# Patient Record
Sex: FEMALE | Race: BLACK OR AFRICAN AMERICAN | Employment: FULL TIME | URBAN - METROPOLITAN AREA
[De-identification: names, ages, dates, MRNs, and addresses within clinical notes are randomized per-mention and may not be internally consistent; named-entity substitution may affect disease eponyms.]

---

## 2022-06-29 LAB
CHLAMYDIA, EXTERNAL: NEGATIVE
HBSAG, EXTERNAL: NEGATIVE
HIV, EXTERNAL: NEGATIVE
N. GONORRHEA, EXTERNAL: NEGATIVE
RPR, EXTERNAL: NON REACTIVE
RUBELLA, EXTERNAL: NORMAL
TYPE, ABO & RH, EXTERNAL: NORMAL

## 2023-01-11 LAB — GRBS, EXTERNAL: NEGATIVE

## 2023-02-13 ENCOUNTER — HOSPITAL ENCOUNTER (INPATIENT)
Age: 23
LOS: 5 days | Discharge: HOME | End: 2023-02-18
Attending: OBSTETRICS & GYNECOLOGY | Admitting: OBSTETRICS & GYNECOLOGY
Payer: COMMERCIAL

## 2023-02-13 LAB
ERYTHROCYTE [DISTWIDTH] IN BLOOD BY AUTOMATED COUNT: 16.5 % (ref 11.5–14.5)
HCT VFR BLD AUTO: 34.1 % (ref 35–47)
HGB BLD-MCNC: 10.6 G/DL (ref 11.5–16)
MCH RBC QN AUTO: 24.8 PG (ref 26–34)
MCHC RBC AUTO-ENTMCNC: 31.1 G/DL (ref 30–36.5)
MCV RBC AUTO: 79.9 FL (ref 80–99)
NRBC # BLD: 0 K/UL (ref 0–0.01)
NRBC BLD-RTO: 0 PER 100 WBC
PLATELET # BLD AUTO: 251 K/UL (ref 150–400)
PMV BLD AUTO: 10.7 FL (ref 8.9–12.9)
RBC # BLD AUTO: 4.27 M/UL (ref 3.8–5.2)
WBC # BLD AUTO: 13.6 K/UL (ref 3.6–11)

## 2023-02-13 PROCEDURE — 75410000002 HC LABOR FEE PER 1 HR

## 2023-02-13 PROCEDURE — 3E0DXGC INTRODUCTION OF OTHER THERAPEUTIC SUBSTANCE INTO MOUTH AND PHARYNX, EXTERNAL APPROACH: ICD-10-PCS | Performed by: OBSTETRICS & GYNECOLOGY

## 2023-02-13 PROCEDURE — 86900 BLOOD TYPING SEROLOGIC ABO: CPT

## 2023-02-13 PROCEDURE — 85027 COMPLETE CBC AUTOMATED: CPT

## 2023-02-13 PROCEDURE — 4A1HXCZ MONITORING OF PRODUCTS OF CONCEPTION, CARDIAC RATE, EXTERNAL APPROACH: ICD-10-PCS | Performed by: OBSTETRICS & GYNECOLOGY

## 2023-02-13 PROCEDURE — 74011250636 HC RX REV CODE- 250/636: Performed by: OBSTETRICS & GYNECOLOGY

## 2023-02-13 PROCEDURE — 65270000029 HC RM PRIVATE

## 2023-02-13 PROCEDURE — 36415 COLL VENOUS BLD VENIPUNCTURE: CPT

## 2023-02-13 RX ORDER — BUTORPHANOL TARTRATE 1 MG/ML
1 INJECTION INTRAMUSCULAR; INTRAVENOUS
Status: DISCONTINUED | OUTPATIENT
Start: 2023-02-13 | End: 2023-02-15 | Stop reason: HOSPADM

## 2023-02-13 RX ORDER — ZOLPIDEM TARTRATE 5 MG/1
5 TABLET ORAL
Status: DISCONTINUED | OUTPATIENT
Start: 2023-02-13 | End: 2023-02-15 | Stop reason: HOSPADM

## 2023-02-13 RX ORDER — OXYTOCIN/RINGER'S LACTATE 30/500 ML
0-20 PLASTIC BAG, INJECTION (ML) INTRAVENOUS
Status: DISCONTINUED | OUTPATIENT
Start: 2023-02-14 | End: 2023-02-15 | Stop reason: HOSPADM

## 2023-02-13 RX ORDER — SODIUM CHLORIDE 0.9 % (FLUSH) 0.9 %
5-40 SYRINGE (ML) INJECTION EVERY 8 HOURS
Status: DISCONTINUED | OUTPATIENT
Start: 2023-02-13 | End: 2023-02-15 | Stop reason: HOSPADM

## 2023-02-13 RX ORDER — SODIUM CHLORIDE, SODIUM LACTATE, POTASSIUM CHLORIDE, CALCIUM CHLORIDE 600; 310; 30; 20 MG/100ML; MG/100ML; MG/100ML; MG/100ML
125 INJECTION, SOLUTION INTRAVENOUS CONTINUOUS
Status: DISCONTINUED | OUTPATIENT
Start: 2023-02-13 | End: 2023-02-16

## 2023-02-13 RX ORDER — SODIUM CHLORIDE 0.9 % (FLUSH) 0.9 %
5-40 SYRINGE (ML) INJECTION AS NEEDED
Status: DISCONTINUED | OUTPATIENT
Start: 2023-02-13 | End: 2023-02-15 | Stop reason: HOSPADM

## 2023-02-13 RX ADMIN — BUTORPHANOL TARTRATE 1 MG: 1 INJECTION, SOLUTION INTRAMUSCULAR; INTRAVENOUS at 20:10

## 2023-02-13 RX ADMIN — BUTORPHANOL TARTRATE 1 MG: 1 INJECTION, SOLUTION INTRAMUSCULAR; INTRAVENOUS at 23:54

## 2023-02-13 NOTE — PROGRESS NOTES
1615-Pt arrived to labor and delivery room 5 for scheduled induction for post dates. G1.  LILIAN 2/8/23. Pt denies any complications during pregnancy other than anemia. States appropriate fetal movement. 1752-Just made aware that Dr Levi Henson signed out to Dr Albert Anderson. Dr Albert Anderson called and aware that patient is in room 6.   1810-Dr Pearl at bedside, viewed strip. Discussing POC with patient. 1815-Pt placed in stirrups, cervical ripening balloon placed. 60ml in uterine and 60ml in vaginal balloon. Will monitor post balloon prior to giving cytotec since patient has been ciera. 1930-Bedside and Verbal shift change report given to VALENCIA Bejarano RN  (oncoming nurse) by Cecily Tesfaye. Ignacia Brown RN  (offgoing nurse). Report included the following information SBAR.

## 2023-02-13 NOTE — H&P
History & Physical    Name: Josiah Last MRN: [id number]  SSN: [id number]    YOB: 2000  Age: 25 y.o. Sex: female      Subjective: Induction     Estimated Date of Delivery: None noted. OB History          1    Para        Term                AB        Living             SAB        IAB        Ectopic        Molar        Multiple        Live Births                    Ms. Margarita Tapia is admitted with pregnancy at 40w5d for induction of labor due to late term. Prenatal course was complicated by  anemia (hasn't been taking iron as advised) and elevated WBC which has declined with serial monitoring (17.7- 15.6- 14.7) . Please see prenatal records for details. PMH: lichen planus, eczema  PSH: none  Social History     Occupational History    Not on file   Tobacco Use    Smoking status: Not on file    Smokeless tobacco: Not on file   Substance and Sexual Activity    Alcohol use: Not on file    Drug use: Not on file    Sexual activity: Not on file     No family history on file. Not on File  Prior to Admission medications    Not on File    NKDA    Review of Systems: Pertinent items are noted in the History of Present Illness. Objective:     Vitals: There were no vitals filed for this visit. Physical Exam:  Patient without distress. Abdomen: soft, nontender  Cervical Exam: Closed/Thick/High (at last exam on 23)  Lower Extremities:  - Edema No  EFW 7#  Membranes:  Intact  Fetal Heart Rate: pending upon admission          Prenatal Labs:   O+/RI/GBS neg    Impression/Plan:     Plan: Admit for induction of labor. Group B Strep negative. Plan miso tonight and placement of balloon if possible.     Signed By:  Anita Bradley MD     2023

## 2023-02-13 NOTE — PROGRESS NOTES
OBHospitalist Pocedure Progress Note  Patient seen, fetal heart rate and contraction pattern evaluated.      Physical Exam:  Cervical Exam: Visually 1 cm  Membranes:  Intact  Uterine Activity: Frequency: irregular  Fetal Heart Rate:130  Accelerations: yes  Decelerations: none  Variability- Moderate  Uterine contractions: irregular    Assessment/Plan:  Late term  @ 40w5d  Cook Cath 60/60 placed    Buccal Cytotec marvin Oquendo MD

## 2023-02-14 ENCOUNTER — ANESTHESIA EVENT (OUTPATIENT)
Dept: LABOR AND DELIVERY | Age: 23
End: 2023-02-14
Payer: COMMERCIAL

## 2023-02-14 ENCOUNTER — ANESTHESIA (OUTPATIENT)
Dept: LABOR AND DELIVERY | Age: 23
End: 2023-02-14
Payer: COMMERCIAL

## 2023-02-14 PROCEDURE — 74011000258 HC RX REV CODE- 258: Performed by: OBSTETRICS & GYNECOLOGY

## 2023-02-14 PROCEDURE — 74011250636 HC RX REV CODE- 250/636: Performed by: STUDENT IN AN ORGANIZED HEALTH CARE EDUCATION/TRAINING PROGRAM

## 2023-02-14 PROCEDURE — 74011000250 HC RX REV CODE- 250: Performed by: STUDENT IN AN ORGANIZED HEALTH CARE EDUCATION/TRAINING PROGRAM

## 2023-02-14 PROCEDURE — 74011000250 HC RX REV CODE- 250

## 2023-02-14 PROCEDURE — 74011250637 HC RX REV CODE- 250/637: Performed by: OBSTETRICS & GYNECOLOGY

## 2023-02-14 PROCEDURE — 3E033VJ INTRODUCTION OF OTHER HORMONE INTO PERIPHERAL VEIN, PERCUTANEOUS APPROACH: ICD-10-PCS | Performed by: OBSTETRICS & GYNECOLOGY

## 2023-02-14 PROCEDURE — 65270000029 HC RM PRIVATE

## 2023-02-14 PROCEDURE — 75410000002 HC LABOR FEE PER 1 HR

## 2023-02-14 PROCEDURE — 74011250636 HC RX REV CODE- 250/636: Performed by: OBSTETRICS & GYNECOLOGY

## 2023-02-14 PROCEDURE — 74011250637 HC RX REV CODE- 250/637: Performed by: NURSE PRACTITIONER

## 2023-02-14 RX ORDER — ACETAMINOPHEN 500 MG
1000 TABLET ORAL ONCE
Status: COMPLETED | OUTPATIENT
Start: 2023-02-14 | End: 2023-02-14

## 2023-02-14 RX ORDER — BUPIVACAINE HYDROCHLORIDE 2.5 MG/ML
INJECTION, SOLUTION EPIDURAL; INFILTRATION; INTRACAUDAL AS NEEDED
Status: DISCONTINUED | OUTPATIENT
Start: 2023-02-14 | End: 2023-02-15 | Stop reason: HOSPADM

## 2023-02-14 RX ORDER — FENTANYL/BUPIVACAINE/NS/PF 2-1250MCG
1-16 PREFILLED PUMP RESERVOIR EPIDURAL CONTINUOUS
Status: DISCONTINUED | OUTPATIENT
Start: 2023-02-14 | End: 2023-02-15 | Stop reason: HOSPADM

## 2023-02-14 RX ORDER — BUPIVACAINE HYDROCHLORIDE 5 MG/ML
10 INJECTION, SOLUTION EPIDURAL; INTRACAUDAL AS NEEDED
Status: DISCONTINUED | OUTPATIENT
Start: 2023-02-14 | End: 2023-02-15 | Stop reason: HOSPADM

## 2023-02-14 RX ORDER — LIDOCAINE HYDROCHLORIDE AND EPINEPHRINE 15; 5 MG/ML; UG/ML
1.5 INJECTION, SOLUTION EPIDURAL AS NEEDED
Status: DISCONTINUED | OUTPATIENT
Start: 2023-02-14 | End: 2023-02-15 | Stop reason: HOSPADM

## 2023-02-14 RX ORDER — NORETHINDRONE AND ETHINYL ESTRADIOL 0.5-0.035
KIT ORAL
Status: DISCONTINUED
Start: 2023-02-14 | End: 2023-02-15 | Stop reason: WASHOUT

## 2023-02-14 RX ORDER — DIPHENHYDRAMINE HYDROCHLORIDE 50 MG/ML
25 INJECTION, SOLUTION INTRAMUSCULAR; INTRAVENOUS
Status: DISCONTINUED | OUTPATIENT
Start: 2023-02-14 | End: 2023-02-15 | Stop reason: HOSPADM

## 2023-02-14 RX ORDER — BUPIVACAINE HYDROCHLORIDE 5 MG/ML
INJECTION, SOLUTION EPIDURAL; INTRACAUDAL
Status: COMPLETED | OUTPATIENT
Start: 2023-02-14 | End: 2023-02-14

## 2023-02-14 RX ORDER — CALCIUM CARBONATE 200(500)MG
400 TABLET,CHEWABLE ORAL
Status: DISCONTINUED | OUTPATIENT
Start: 2023-02-15 | End: 2023-02-16

## 2023-02-14 RX ORDER — FENTANYL CITRATE 50 UG/ML
INJECTION, SOLUTION INTRAMUSCULAR; INTRAVENOUS
Status: COMPLETED | OUTPATIENT
Start: 2023-02-14 | End: 2023-02-14

## 2023-02-14 RX ORDER — FENTANYL CITRATE 50 UG/ML
100 INJECTION, SOLUTION INTRAMUSCULAR; INTRAVENOUS ONCE
Status: COMPLETED | OUTPATIENT
Start: 2023-02-14 | End: 2023-02-14

## 2023-02-14 RX ORDER — LANOLIN ALCOHOL/MO/W.PET/CERES
325 CREAM (GRAM) TOPICAL
COMMUNITY

## 2023-02-14 RX ORDER — BUPIVACAINE HYDROCHLORIDE 2.5 MG/ML
INJECTION, SOLUTION EPIDURAL; INFILTRATION; INTRACAUDAL
Status: COMPLETED
Start: 2023-02-14 | End: 2023-02-14

## 2023-02-14 RX ORDER — BUPIVACAINE HYDROCHLORIDE 5 MG/ML
INJECTION, SOLUTION EPIDURAL; INTRACAUDAL
Status: COMPLETED
Start: 2023-02-14 | End: 2023-02-14

## 2023-02-14 RX ORDER — FENTANYL CITRATE 50 UG/ML
INJECTION, SOLUTION INTRAMUSCULAR; INTRAVENOUS
Status: COMPLETED
Start: 2023-02-14 | End: 2023-02-14

## 2023-02-14 RX ORDER — FENTANYL/BUPIVACAINE/NS/PF 2-1250MCG
PREFILLED PUMP RESERVOIR EPIDURAL
Status: COMPLETED
Start: 2023-02-14 | End: 2023-02-14

## 2023-02-14 RX ORDER — NALOXONE HYDROCHLORIDE 0.4 MG/ML
0.4 INJECTION, SOLUTION INTRAMUSCULAR; INTRAVENOUS; SUBCUTANEOUS AS NEEDED
Status: DISCONTINUED | OUTPATIENT
Start: 2023-02-14 | End: 2023-02-15 | Stop reason: HOSPADM

## 2023-02-14 RX ORDER — NORETHINDRONE AND ETHINYL ESTRADIOL 0.5-0.035
10 KIT ORAL ONCE
Status: ACTIVE | OUTPATIENT
Start: 2023-02-14 | End: 2023-02-15

## 2023-02-14 RX ADMIN — BUTORPHANOL TARTRATE 1 MG: 1 INJECTION, SOLUTION INTRAMUSCULAR; INTRAVENOUS at 11:19

## 2023-02-14 RX ADMIN — Medication 10 ML/HR: at 13:37

## 2023-02-14 RX ADMIN — SODIUM CHLORIDE, POTASSIUM CHLORIDE, SODIUM LACTATE AND CALCIUM CHLORIDE 125 ML/HR: 600; 310; 30; 20 INJECTION, SOLUTION INTRAVENOUS at 15:06

## 2023-02-14 RX ADMIN — BUPIVACAINE HYDROCHLORIDE 5 MG: 5 INJECTION, SOLUTION EPIDURAL; INTRACAUDAL; PERINEURAL at 12:55

## 2023-02-14 RX ADMIN — FENTANYL CITRATE 100 MCG: 50 INJECTION, SOLUTION INTRAMUSCULAR; INTRAVENOUS at 12:55

## 2023-02-14 RX ADMIN — Medication 25 MCG: at 01:59

## 2023-02-14 RX ADMIN — OXYTOCIN 2 MILLI-UNITS/MIN: 10 INJECTION INTRAVENOUS at 06:03

## 2023-02-14 RX ADMIN — Medication 10 ML/HR: at 21:29

## 2023-02-14 RX ADMIN — FENTANYL CITRATE 100 MCG: 50 INJECTION, SOLUTION INTRAMUSCULAR; INTRAVENOUS at 22:45

## 2023-02-14 RX ADMIN — SODIUM CHLORIDE, POTASSIUM CHLORIDE, SODIUM LACTATE AND CALCIUM CHLORIDE 125 ML/HR: 600; 310; 30; 20 INJECTION, SOLUTION INTRAVENOUS at 10:16

## 2023-02-14 RX ADMIN — BUPIVACAINE HYDROCHLORIDE 8 ML: 2.5 INJECTION, SOLUTION EPIDURAL; INFILTRATION; INTRACAUDAL; PERINEURAL at 22:45

## 2023-02-14 RX ADMIN — PROMETHAZINE HYDROCHLORIDE 12.5 MG: 25 INJECTION INTRAMUSCULAR; INTRAVENOUS at 00:02

## 2023-02-14 RX ADMIN — ACETAMINOPHEN 1000 MG: 500 TABLET, FILM COATED ORAL at 23:00

## 2023-02-14 NOTE — PROGRESS NOTES
Labor Progress Note  Patient seen, fetal heart rate and contraction pattern evaluated, patient examined. Currently on 2mu pit. No pain, not really feeling ctx currently. No data found. Physical Exam:  Cervical Exam:  3 cm dilated    60% effaced    -2 station    Presenting Part: cephalic  Cervical Position: anterior  Uterine Activity: Frequency: Every 2-3 minutes  Fetal Heart Rate: Baseline: 130s per minute  Variability: moderate  Accelerations: yes  Decelerations: none    Assessment/Plan:  Reassuring fetal status, Continue plan for vaginal delivery, Titrate pit.   Possible AROM with next check,  Reciewed plan of care with pt and her family    Simone Curry MD

## 2023-02-14 NOTE — PROGRESS NOTES
1530: Bedside and Verbal shift change report given to Deb Olson RN (oncoming nurse) by Sharan Santizo RN (offgoing nurse). Report included the following information SBAR, MAR, and Recent Results. 1930: Bedside and Verbal shift change report given to Alfred Gilman RN (oncoming nurse) by Deb Olson RN (offgoing nurse). Report included the following information SBAR, MAR, and Recent Results.

## 2023-02-14 NOTE — PROGRESS NOTES
Labor Progress Note  Patient seen, fetal heart rate and contraction pattern evaluated, patient examined.   Now comfortable with epidural.  On 7mu pit  Patient Vitals for the past 2 hrs:   BP Temp Pulse Resp SpO2   02/14/23 1338 -- -- -- -- 100 %   02/14/23 1337 138/79 -- 82 -- --   02/14/23 1333 -- -- -- -- 100 %   02/14/23 1332 134/78 -- 83 -- --   02/14/23 1328 -- -- -- -- 100 %   02/14/23 1327 126/70 -- 83 18 100 %   02/14/23 1323 -- -- -- -- 100 %   02/14/23 1322 131/63 -- 87 -- --   02/14/23 1318 -- -- -- -- 100 %   02/14/23 1316 133/79 -- 78 18 --   02/14/23 1311 128/69 -- 85 18 100 %   02/14/23 1308 -- -- -- -- 100 %   02/14/23 1307 138/84 -- 76 -- --   02/14/23 1305 137/88 -- 83 -- --   02/14/23 1304 137/81 -- 75 18 100 %   02/14/23 1303 -- -- -- -- 100 %   02/14/23 1200 (!) 148/73 98.5 °F (36.9 °C) 81 18 --       Physical Exam:  Cervical Exam:  3 cm dilated    80% effaced    -2 station    Presenting Part: cephalic  AROM- clear fluid  Uterine Activity: Frequency: Every 2-3 minutes  Fetal Heart Rate: Baseline: 150 per minute  Variability: moderate  Accelerations: yes  Decelerations: none    Assessment/Plan:  Reassuring fetal status, Continue plan for vaginal delivery, Titrate agnes Barkley MD

## 2023-02-14 NOTE — PROGRESS NOTES
Problem: Patient Education: Go to Patient Education Activity  Goal: Patient/Family Education  Outcome: Progressing Towards Goal     Problem: Vaginal Delivery: Day of Deliver-Laboring  Goal: Off Pathway (Use only if patient is Off Pathway)  Outcome: Progressing Towards Goal  Goal: Activity/Safety  Outcome: Progressing Towards Goal  Goal: Consults, if ordered  Outcome: Progressing Towards Goal  Goal: Diagnostic Test/Procedures  Outcome: Progressing Towards Goal  Goal: Nutrition/Diet  Outcome: Progressing Towards Goal  Goal: Discharge Planning  Outcome: Progressing Towards Goal  Goal: Medications  Outcome: Progressing Towards Goal  Goal: Respiratory  Outcome: Progressing Towards Goal  Goal: Treatments/Interventions/Procedures  Outcome: Progressing Towards Goal  Goal: *Vital signs within defined limits  Outcome: Progressing Towards Goal  Goal: *Labs within defined limits  Outcome: Progressing Towards Goal  Goal: *Hemodynamically stable  Outcome: Progressing Towards Goal  Goal: *Optimal pain control at patient's stated goal  Outcome: Progressing Towards Goal     Problem: Vaginal Delivery: Day of Delivery-Post delivery  Goal: Off Pathway (Use only if patient is Off Pathway)  Outcome: Progressing Towards Goal  Goal: Activity/Safety  Outcome: Progressing Towards Goal  Goal: Consults, if ordered  Outcome: Progressing Towards Goal  Goal: Nutrition/Diet  Outcome: Progressing Towards Goal  Goal: Discharge Planning  Outcome: Progressing Towards Goal  Goal: Medications  Outcome: Progressing Towards Goal  Goal: Treatments/Interventions/Procedures  Outcome: Progressing Towards Goal  Goal: *Vital signs within defined limits  Outcome: Progressing Towards Goal  Goal: *Labs within defined limits  Outcome: Progressing Towards Goal  Goal: *Hemodynamically stable  Outcome: Progressing Towards Goal  Goal: *Optimal pain control at patient's stated goal  Outcome: Progressing Towards Goal  Goal: *Participates in infant care  Outcome: Progressing Towards Goal  Goal: *Demonstrates progressive activity  Outcome: Progressing Towards Goal  Goal: *Tolerating diet  Outcome: Progressing Towards Goal

## 2023-02-14 NOTE — PROGRESS NOTES
Labor Progress Note  Patient seen, fetal heart rate and contraction pattern evaluated, patient examined.   Comfortable with epidural  Patient Vitals for the past 2 hrs:   BP Temp Pulse Resp SpO2   02/14/23 1559 137/77 97.5 °F (36.4 °C) 73 18 100 %       Physical Exam:  Cervical Exam:  6 cm dilated    100% effaced    0 station    Presenting Part: cephalic   Uterine Activity: Frequency: Every 2-4 minutes (17mu pit)  Fetal Heart Rate: Baseline: 150 per minute  Variability: moderate  Accelerations: yes  Decelerations: none    Assessment/Plan:  Reassuring fetal status, Continue plan for vaginal delivery    Juan Daniel Gilbert MD

## 2023-02-14 NOTE — ANESTHESIA PROCEDURE NOTES
Epidural Block    Patient location during procedure: OB  Start time: 2/14/2023 12:55 PM  End time: 2/14/2023 1:10 PM  Reason for block: labor epidural  Staffing  Performed: attending   Anesthesiologist: Merle Rebolledo MD  Preanesthetic Checklist  Completed: patient identified, IV checked, site marked, risks and benefits discussed, surgical consent, monitors and equipment checked, pre-op evaluation and timeout performed  Block Placement  Patient position: sitting  Prep: DuraPrep  Sterility prep: cap, drape, gloves and mask  Sedation level: no sedation  Patient monitoring: continuous pulse oximetry and heart rate  Approach: midline  Location: lumbar  Lumbar location: L3-L4  Epidural  Loss of resistance technique: saline  Guidance: landmark technique  Needle  Needle type: Tuohy   Needle gauge: 17 G  Needle length: 9 cm  Needle insertion depth: 6 cm  Catheter type: end hole  Catheter size: 19 G  Catheter at skin depth: 11 cm  Catheter securement method: clear occlusive dressing, surgical tape and liquid medical adhesive  Medications Administered  fentaNYL citrate (PF) injection sedation initial - Epidural   100 mcg - 2/14/2023 12:55:00 PM  bupivacaine (PF) (MARCAINE) 0.5 % (5 mg/mL) Epidural - Epidural   5 mg - 2/14/2023 12:55:00 PM  Assessment  Block outcome: pain improved  Number of attempts: 1  Procedure assessment: patient tolerated procedure well with no immediate complications

## 2023-02-14 NOTE — PROGRESS NOTES
1930 Report received from FRANSISCA Fong notified pt medicated with stadol request phenergan orders received. 2130 Dr Alon Fong notified of continued uncomfortable from cook catheter after medication order to decrease 20cc/bulb which remain 40/40.    2225 continues to have some discomfort with contractions after decreasing fluid out of each bulb. But able to tolerated at this time. 2335 Complain of bleeding slight tug on catheter came out with 40/40 in bulbs. No active bleeding noted. 2/14/23  0740 Bedside shift change report given to GUILHERME Arana (oncoming nurse) by Lc Tom Rn (offgoing nurse). Report included the following information SBAR, Procedure Summary, MAR, Recent Results, and Med Rec Status.

## 2023-02-14 NOTE — PROGRESS NOTES
Dr. Misha Taylor at bedside assessing pt, viewed FHR strip. Cervical exam per Dr. Misha Taylor:  3-4/50/-2. Discussed plan of care with pt and answered questions. 0740: Bedside and Verbal shift change report given to GUILHERME Muñiz RN (oncoming nurse) by VALENCIA Quan RN (offgoing nurse). Report included the following information SBAR, Kardex, Intake/Output, MAR, and Recent Results. 1936:  Pt up to BR, on remote telemetry. 1040:  Pt up to BR, on remote telemetry. 1100:  Pt tried Nitrous Oxide, but states did not help, requesting IV medication. 1113:  Cervical exam per David Hall RN:  posterior, pt very tense with exam, difficult to assess. 1119:  Medication given, see MAR.  1212:  Pt up to BR, Dr. Misha Taylor at bedside, viewed FHR strip. Discussed with pt option to be checked now or get epidural first, pt decided to get epidural first.  IV bolus started, see MAR.  1255:  Pt sitting on side of bed.  1256:  Dr. Derick West at bedside to place epidural.  Monitors off.  1345:  Dr. Misha Taylor at bedside assessing pt, viewed FHR strip. 1347:  Noted damp spot on chux, questionable SROM, AROM of forebag per Dr. Misha Taylor, noted moderate amt of clear fluid. Cervical exam per Dr. Misha Taylor:  3/80/-2.  :  Bedside and Verbal shift change report given to VAELNCIA Rojas RN (oncoming nurse) by GUILHERME Muñiz RN (offgoing nurse). Report included the following information SBAR, Kardex, Intake/Output, MAR, and Recent Results.

## 2023-02-14 NOTE — ANESTHESIA PREPROCEDURE EVALUATION
Relevant Problems   No relevant active problems       Anesthetic History   No history of anesthetic complications            Review of Systems / Medical History  Patient summary reviewed, nursing notes reviewed and pertinent labs reviewed    Pulmonary  Within defined limits                 Neuro/Psych   Within defined limits           Cardiovascular  Within defined limits                Exercise tolerance: >4 METS     GI/Hepatic/Renal  Within defined limits              Endo/Other  Within defined limits           Other Findings              Physical Exam    Airway  Mallampati: II  TM Distance: 4 - 6 cm  Neck ROM: normal range of motion   Mouth opening: Normal     Cardiovascular    Rhythm: regular  Rate: normal         Dental  No notable dental hx       Pulmonary  Breath sounds clear to auscultation               Abdominal  GI exam deferred       Other Findings            Anesthetic Plan    ASA: 2  Anesthesia type: epidural          Induction: Intravenous  Anesthetic plan and risks discussed with: Patient

## 2023-02-15 LAB
ABO + RH BLD: NORMAL
ALBUMIN SERPL-MCNC: 2 G/DL (ref 3.5–5)
ALBUMIN/GLOB SERPL: ABNORMAL (ref 1.1–2.2)
ALP SERPL-CCNC: ABNORMAL U/L (ref 45–117)
ALT SERPL-CCNC: ABNORMAL U/L (ref 12–78)
ANION GAP SERPL CALC-SCNC: 11 MMOL/L (ref 5–15)
AST SERPL-CCNC: ABNORMAL U/L (ref 15–37)
BILIRUB SERPL-MCNC: 1.3 MG/DL (ref 0.2–1)
BLOOD GROUP ANTIBODIES SERPL: NORMAL
BUN SERPL-MCNC: ABNORMAL MG/DL (ref 6–20)
BUN/CREAT SERPL: ABNORMAL (ref 12–20)
CALCIUM SERPL-MCNC: ABNORMAL MG/DL (ref 8.5–10.1)
CHLORIDE SERPL-SCNC: 106 MMOL/L (ref 97–108)
CO2 SERPL-SCNC: 19 MMOL/L (ref 21–32)
CREAT SERPL-MCNC: 1.32 MG/DL (ref 0.55–1.02)
CREAT UR-MCNC: <13 MG/DL
ERYTHROCYTE [DISTWIDTH] IN BLOOD BY AUTOMATED COUNT: 17.2 % (ref 11.5–14.5)
GLOBULIN SER CALC-MCNC: ABNORMAL G/DL (ref 2–4)
GLUCOSE SERPL-MCNC: ABNORMAL MG/DL (ref 65–100)
HCT VFR BLD AUTO: 33.6 % (ref 35–47)
HGB BLD-MCNC: 10.7 G/DL (ref 11.5–16)
MCH RBC QN AUTO: 25.8 PG (ref 26–34)
MCHC RBC AUTO-ENTMCNC: 31.8 G/DL (ref 30–36.5)
MCV RBC AUTO: 81.2 FL (ref 80–99)
NRBC # BLD: 0 K/UL (ref 0–0.01)
NRBC BLD-RTO: 0 PER 100 WBC
PLATELET # BLD AUTO: 345 K/UL (ref 150–400)
PMV BLD AUTO: 11.9 FL (ref 8.9–12.9)
POTASSIUM SERPL-SCNC: 4.2 MMOL/L (ref 3.5–5.1)
PROT SERPL-MCNC: ABNORMAL G/DL (ref 6.4–8.2)
PROT UR-MCNC: 9 MG/DL (ref 0–11.9)
PROT/CREAT UR-RTO: <0.7
RBC # BLD AUTO: 4.14 M/UL (ref 3.8–5.2)
SODIUM SERPL-SCNC: 136 MMOL/L (ref 136–145)
SPECIMEN EXP DATE BLD: NORMAL
URATE SERPL-MCNC: 8.6 MG/DL (ref 2.6–6)
WBC # BLD AUTO: 32.4 K/UL (ref 3.6–11)

## 2023-02-15 PROCEDURE — 77030036554

## 2023-02-15 PROCEDURE — 85027 COMPLETE CBC AUTOMATED: CPT

## 2023-02-15 PROCEDURE — 74011250636 HC RX REV CODE- 250/636: Performed by: ANESTHESIOLOGY

## 2023-02-15 PROCEDURE — 74011250636 HC RX REV CODE- 250/636

## 2023-02-15 PROCEDURE — 75410000002 HC LABOR FEE PER 1 HR

## 2023-02-15 PROCEDURE — 84550 ASSAY OF BLOOD/URIC ACID: CPT

## 2023-02-15 PROCEDURE — 76060000078 HC EPIDURAL ANESTHESIA: Performed by: OBSTETRICS & GYNECOLOGY

## 2023-02-15 PROCEDURE — 76010000391 HC C SECN FIRST 1 HR: Performed by: OBSTETRICS & GYNECOLOGY

## 2023-02-15 PROCEDURE — 88307 TISSUE EXAM BY PATHOLOGIST: CPT

## 2023-02-15 PROCEDURE — 74011000250 HC RX REV CODE- 250: Performed by: OBSTETRICS & GYNECOLOGY

## 2023-02-15 PROCEDURE — 74011250636 HC RX REV CODE- 250/636: Performed by: OBSTETRICS & GYNECOLOGY

## 2023-02-15 PROCEDURE — 84156 ASSAY OF PROTEIN URINE: CPT

## 2023-02-15 PROCEDURE — 65410000002 HC RM PRIVATE OB

## 2023-02-15 PROCEDURE — 74011000250 HC RX REV CODE- 250: Performed by: ANESTHESIOLOGY

## 2023-02-15 PROCEDURE — 75410000003 HC RECOV DEL/VAG/CSECN EA 0.5 HR: Performed by: OBSTETRICS & GYNECOLOGY

## 2023-02-15 PROCEDURE — 80053 COMPREHEN METABOLIC PANEL: CPT

## 2023-02-15 PROCEDURE — 74011000250 HC RX REV CODE- 250: Performed by: STUDENT IN AN ORGANIZED HEALTH CARE EDUCATION/TRAINING PROGRAM

## 2023-02-15 PROCEDURE — 36415 COLL VENOUS BLD VENIPUNCTURE: CPT

## 2023-02-15 PROCEDURE — 76010000392 HC C SECN EA ADDL 0.5 HR: Performed by: OBSTETRICS & GYNECOLOGY

## 2023-02-15 PROCEDURE — 74011250637 HC RX REV CODE- 250/637: Performed by: OBSTETRICS & GYNECOLOGY

## 2023-02-15 RX ORDER — ACETAMINOPHEN 325 MG/1
650 TABLET ORAL
Status: DISCONTINUED | OUTPATIENT
Start: 2023-02-15 | End: 2023-02-18 | Stop reason: HOSPADM

## 2023-02-15 RX ORDER — BUPIVACAINE HYDROCHLORIDE 7.5 MG/ML
INJECTION, SOLUTION EPIDURAL; RETROBULBAR
Status: COMPLETED | OUTPATIENT
Start: 2023-02-15 | End: 2023-02-15

## 2023-02-15 RX ORDER — MORPHINE SULFATE 10 MG/ML
10 INJECTION, SOLUTION INTRAMUSCULAR; INTRAVENOUS
Status: DISCONTINUED | OUTPATIENT
Start: 2023-02-15 | End: 2023-02-18 | Stop reason: HOSPADM

## 2023-02-15 RX ORDER — LABETALOL 200 MG/1
200 TABLET, FILM COATED ORAL EVERY 12 HOURS
Status: DISCONTINUED | OUTPATIENT
Start: 2023-02-15 | End: 2023-02-18

## 2023-02-15 RX ORDER — OXYCODONE HYDROCHLORIDE 5 MG/1
5 TABLET ORAL
Status: DISCONTINUED | OUTPATIENT
Start: 2023-02-15 | End: 2023-02-18 | Stop reason: HOSPADM

## 2023-02-15 RX ORDER — MIDAZOLAM HYDROCHLORIDE 1 MG/ML
INJECTION, SOLUTION INTRAMUSCULAR; INTRAVENOUS AS NEEDED
Status: DISCONTINUED | OUTPATIENT
Start: 2023-02-15 | End: 2023-02-15 | Stop reason: HOSPADM

## 2023-02-15 RX ORDER — OXYTOCIN/RINGER'S LACTATE 30/500 ML
10 PLASTIC BAG, INJECTION (ML) INTRAVENOUS AS NEEDED
Status: DISCONTINUED | OUTPATIENT
Start: 2023-02-15 | End: 2023-02-18 | Stop reason: HOSPADM

## 2023-02-15 RX ORDER — IBUPROFEN 400 MG/1
800 TABLET ORAL EVERY 8 HOURS
Status: DISCONTINUED | OUTPATIENT
Start: 2023-02-15 | End: 2023-02-18 | Stop reason: HOSPADM

## 2023-02-15 RX ORDER — FENTANYL CITRATE 50 UG/ML
INJECTION, SOLUTION INTRAMUSCULAR; INTRAVENOUS
Status: COMPLETED | OUTPATIENT
Start: 2023-02-15 | End: 2023-02-15

## 2023-02-15 RX ORDER — NALOXONE HYDROCHLORIDE 0.4 MG/ML
0.4 INJECTION, SOLUTION INTRAMUSCULAR; INTRAVENOUS; SUBCUTANEOUS AS NEEDED
Status: DISCONTINUED | OUTPATIENT
Start: 2023-02-15 | End: 2023-02-18 | Stop reason: HOSPADM

## 2023-02-15 RX ORDER — KETOROLAC TROMETHAMINE 30 MG/ML
15 INJECTION, SOLUTION INTRAMUSCULAR; INTRAVENOUS EVERY 6 HOURS
Status: COMPLETED | OUTPATIENT
Start: 2023-02-15 | End: 2023-02-16

## 2023-02-15 RX ORDER — NALBUPHINE HYDROCHLORIDE 10 MG/ML
2.5 INJECTION, SOLUTION INTRAMUSCULAR; INTRAVENOUS; SUBCUTANEOUS
Status: DISCONTINUED | OUTPATIENT
Start: 2023-02-15 | End: 2023-02-18 | Stop reason: HOSPADM

## 2023-02-15 RX ORDER — SODIUM CHLORIDE 0.9 % (FLUSH) 0.9 %
5-40 SYRINGE (ML) INJECTION EVERY 8 HOURS
Status: DISCONTINUED | OUTPATIENT
Start: 2023-02-15 | End: 2023-02-18 | Stop reason: HOSPADM

## 2023-02-15 RX ORDER — MORPHINE SULFATE 10 MG/ML
6 INJECTION, SOLUTION INTRAMUSCULAR; INTRAVENOUS
Status: DISCONTINUED | OUTPATIENT
Start: 2023-02-15 | End: 2023-02-18 | Stop reason: HOSPADM

## 2023-02-15 RX ORDER — LIDOCAINE HYDROCHLORIDE AND EPINEPHRINE 20; 5 MG/ML; UG/ML
INJECTION, SOLUTION EPIDURAL; INFILTRATION; INTRACAUDAL; PERINEURAL AS NEEDED
Status: DISCONTINUED | OUTPATIENT
Start: 2023-02-15 | End: 2023-02-15 | Stop reason: HOSPADM

## 2023-02-15 RX ORDER — OXYTOCIN/RINGER'S LACTATE 30/500 ML
87.3 PLASTIC BAG, INJECTION (ML) INTRAVENOUS AS NEEDED
Status: DISCONTINUED | OUTPATIENT
Start: 2023-02-15 | End: 2023-02-18 | Stop reason: HOSPADM

## 2023-02-15 RX ORDER — METHYLERGONOVINE MALEATE 0.2 MG/ML
INJECTION INTRAVENOUS AS NEEDED
Status: DISCONTINUED | OUTPATIENT
Start: 2023-02-15 | End: 2023-02-15 | Stop reason: HOSPADM

## 2023-02-15 RX ORDER — OXYTOCIN/RINGER'S LACTATE 30/500 ML
PLASTIC BAG, INJECTION (ML) INTRAVENOUS
Status: DISCONTINUED | OUTPATIENT
Start: 2023-02-15 | End: 2023-02-15 | Stop reason: HOSPADM

## 2023-02-15 RX ORDER — MORPHINE SULFATE 1 MG/ML
INJECTION, SOLUTION EPIDURAL; INTRATHECAL; INTRAVENOUS
Status: COMPLETED | OUTPATIENT
Start: 2023-02-15 | End: 2023-02-15

## 2023-02-15 RX ORDER — ONDANSETRON 2 MG/ML
4 INJECTION INTRAMUSCULAR; INTRAVENOUS
Status: DISCONTINUED | OUTPATIENT
Start: 2023-02-15 | End: 2023-02-18 | Stop reason: HOSPADM

## 2023-02-15 RX ORDER — MISOPROSTOL 100 UG/1
TABLET ORAL AS NEEDED
Status: DISCONTINUED | OUTPATIENT
Start: 2023-02-15 | End: 2023-02-15 | Stop reason: HOSPADM

## 2023-02-15 RX ORDER — ZOLPIDEM TARTRATE 5 MG/1
5 TABLET ORAL
Status: DISCONTINUED | OUTPATIENT
Start: 2023-02-15 | End: 2023-02-18 | Stop reason: HOSPADM

## 2023-02-15 RX ORDER — SODIUM CHLORIDE 0.9 % (FLUSH) 0.9 %
5-40 SYRINGE (ML) INJECTION AS NEEDED
Status: DISCONTINUED | OUTPATIENT
Start: 2023-02-15 | End: 2023-02-18 | Stop reason: HOSPADM

## 2023-02-15 RX ORDER — METHYLERGONOVINE MALEATE 0.2 MG/ML
INJECTION INTRAVENOUS
Status: DISPENSED
Start: 2023-02-15 | End: 2023-02-15

## 2023-02-15 RX ORDER — ONDANSETRON 2 MG/ML
INJECTION INTRAMUSCULAR; INTRAVENOUS
Status: COMPLETED
Start: 2023-02-15 | End: 2023-02-15

## 2023-02-15 RX ORDER — SIMETHICONE 80 MG
80 TABLET,CHEWABLE ORAL AS NEEDED
Status: DISCONTINUED | OUTPATIENT
Start: 2023-02-15 | End: 2023-02-18 | Stop reason: HOSPADM

## 2023-02-15 RX ORDER — ONDANSETRON 2 MG/ML
4 INJECTION INTRAMUSCULAR; INTRAVENOUS
Status: DISCONTINUED | OUTPATIENT
Start: 2023-02-15 | End: 2023-02-15 | Stop reason: SDUPTHER

## 2023-02-15 RX ORDER — DOCUSATE SODIUM 100 MG/1
100 CAPSULE, LIQUID FILLED ORAL 2 TIMES DAILY
Status: DISCONTINUED | OUTPATIENT
Start: 2023-02-15 | End: 2023-02-18 | Stop reason: HOSPADM

## 2023-02-15 RX ORDER — SODIUM CHLORIDE, SODIUM LACTATE, POTASSIUM CHLORIDE, CALCIUM CHLORIDE 600; 310; 30; 20 MG/100ML; MG/100ML; MG/100ML; MG/100ML
100 INJECTION, SOLUTION INTRAVENOUS CONTINUOUS
Status: DISCONTINUED | OUTPATIENT
Start: 2023-02-15 | End: 2023-02-16

## 2023-02-15 RX ORDER — LIDOCAINE HYDROCHLORIDE AND EPINEPHRINE 20; 5 MG/ML; UG/ML
INJECTION, SOLUTION EPIDURAL; INFILTRATION; INTRACAUDAL; PERINEURAL
Status: COMPLETED
Start: 2023-02-15 | End: 2023-02-15

## 2023-02-15 RX ORDER — NALOXONE HYDROCHLORIDE 0.4 MG/ML
0.4 INJECTION, SOLUTION INTRAMUSCULAR; INTRAVENOUS; SUBCUTANEOUS AS NEEDED
Status: DISCONTINUED | OUTPATIENT
Start: 2023-02-15 | End: 2023-02-15 | Stop reason: SDUPTHER

## 2023-02-15 RX ORDER — DIPHENHYDRAMINE HCL 25 MG
25 CAPSULE ORAL
Status: DISCONTINUED | OUTPATIENT
Start: 2023-02-15 | End: 2023-02-18 | Stop reason: HOSPADM

## 2023-02-15 RX ORDER — OXYCODONE HYDROCHLORIDE 5 MG/1
10 TABLET ORAL
Status: DISCONTINUED | OUTPATIENT
Start: 2023-02-15 | End: 2023-02-18 | Stop reason: HOSPADM

## 2023-02-15 RX ADMIN — Medication 10 ML/HR: at 05:01

## 2023-02-15 RX ADMIN — I.V. FAT EMULSION 250 ML/HR: 20 EMULSION INTRAVENOUS at 09:50

## 2023-02-15 RX ADMIN — SODIUM CHLORIDE, PRESERVATIVE FREE 10 ML: 5 INJECTION INTRAVENOUS at 22:00

## 2023-02-15 RX ADMIN — OXYTOCIN 12 MILLI-UNITS/MIN: 10 INJECTION INTRAVENOUS at 04:00

## 2023-02-15 RX ADMIN — LABETALOL HYDROCHLORIDE 200 MG: 200 TABLET, FILM COATED ORAL at 17:57

## 2023-02-15 RX ADMIN — ONDANSETRON 4 MG: 2 INJECTION INTRAMUSCULAR; INTRAVENOUS at 07:15

## 2023-02-15 RX ADMIN — MORPHINE SULFATE 6 MG: 10 INJECTION, SOLUTION INTRAMUSCULAR; INTRAVENOUS at 11:15

## 2023-02-15 RX ADMIN — LIDOCAINE HYDROCHLORIDE AND EPINEPHRINE 5 ML: 20; 5 INJECTION, SOLUTION EPIDURAL; INFILTRATION; INTRACAUDAL; PERINEURAL at 08:53

## 2023-02-15 RX ADMIN — SODIUM CHLORIDE, POTASSIUM CHLORIDE, SODIUM LACTATE AND CALCIUM CHLORIDE 100 ML/HR: 600; 310; 30; 20 INJECTION, SOLUTION INTRAVENOUS at 19:32

## 2023-02-15 RX ADMIN — SODIUM CHLORIDE 40 MCG/MIN: 9 INJECTION, SOLUTION INTRAVENOUS at 09:37

## 2023-02-15 RX ADMIN — SODIUM CHLORIDE, POTASSIUM CHLORIDE, SODIUM LACTATE AND CALCIUM CHLORIDE 125 ML/HR: 600; 310; 30; 20 INJECTION, SOLUTION INTRAVENOUS at 08:44

## 2023-02-15 RX ADMIN — OXYCODONE HYDROCHLORIDE 5 MG: 5 TABLET ORAL at 22:44

## 2023-02-15 RX ADMIN — BUPIVACAINE HYDROCHLORIDE 10 MG: 7.5 INJECTION, SOLUTION EPIDURAL; RETROBULBAR at 09:18

## 2023-02-15 RX ADMIN — LIDOCAINE HYDROCHLORIDE AND EPINEPHRINE 5 ML: 20; 5 INJECTION, SOLUTION EPIDURAL; INFILTRATION; INTRACAUDAL; PERINEURAL at 09:04

## 2023-02-15 RX ADMIN — ONDANSETRON HYDROCHLORIDE 4 MG: 2 SOLUTION INTRAMUSCULAR; INTRAVENOUS at 07:15

## 2023-02-15 RX ADMIN — FENTANYL CITRATE 15 MCG: 50 INJECTION, SOLUTION INTRAMUSCULAR; INTRAVENOUS at 09:18

## 2023-02-15 RX ADMIN — KETOROLAC TROMETHAMINE 15 MG: 30 INJECTION, SOLUTION INTRAMUSCULAR; INTRAVENOUS at 19:32

## 2023-02-15 RX ADMIN — I.V. FAT EMULSION 121.5 ML: 20 EMULSION INTRAVENOUS at 09:36

## 2023-02-15 RX ADMIN — MIDAZOLAM 2 MG: 1 INJECTION INTRAMUSCULAR; INTRAVENOUS at 09:42

## 2023-02-15 RX ADMIN — Medication 909 ML/HR: at 09:38

## 2023-02-15 RX ADMIN — CEFAZOLIN 2 G: 1 INJECTION, POWDER, FOR SOLUTION INTRAMUSCULAR; INTRAVENOUS at 08:59

## 2023-02-15 RX ADMIN — MORPHINE SULFATE 0.15 MG: 1 INJECTION EPIDURAL; INTRATHECAL; INTRAVENOUS at 09:18

## 2023-02-15 RX ADMIN — LIDOCAINE HYDROCHLORIDE AND EPINEPHRINE 5 ML: 20; 5 INJECTION, SOLUTION EPIDURAL; INFILTRATION; INTRACAUDAL; PERINEURAL at 09:00

## 2023-02-15 NOTE — PROCEDURES
Section Delivery Operative Report     Date of Surgery: 2/15/2023     Preoperative Diagnosis: Failure to progress postdates IOL; fetal tachycardia; 41 weeks    Postoperative Diagnosis: same; viable female infant; maternal lidocaine toxicity    Procedure: Procedure(s):  Low transverse  SECTION via pfannenstiel skin incision    Surgeon(s) and Role:     Beverly Brewer MD - Primary     * Celene Nissen, MD - Assisting; Julieta Feeling OR nurse    Anesthesia: Spinal    Findings: viable female infant in DOP position and deflexed head position. Normal uterus, tubes and ovaries. Small paratubal cyst on R tube. Baby girl with apgars 5, 9 per NICU team. Clear fluid. Uterine atony after delivery which responded to pitocin, uterine misoprostol and IM methergine. Complications: none    Drains/implants: none    EBL 600cc; QBL is pending. See anesthesia report for IVF, UOP. Specimen: placenta    Procedure Detail:    The patient was taken to the operating room, where spinal anesthesia was found to be adequate. The patient was prepped and draped in the normal sterile fashion. Pfannenstiel skin incision was made with the scalpel and carried down to the underlying fascia. The fascial incision was extended laterally with the scalpel. The underlying rectus muscles were stretched superior and inferiorly bluntly. The peritoneum was entered in to bluntly and stretched. The bladder blade was then inserted. A low transverse uterine incision was made with the scalpel and extended laterally with blunt finger dissection. The babys head was then delivered atraumatically. The nose and mouth were suctioned. The cord was clamped and cut after delayed cord clamping and the baby was handed off to the waiting NICU unit staff. Placenta was then delivered spontaneously. The uterus was exteriorized and cleared of all clots and debris. The uterine incision was closed in two layers.  The first layer with running locked layer of 0 moonocryl. During this time the uterus was atonic. 800mcg misoprostol was inserted in to the uterus. Pitocin was already being administered. IM methergine also given with great response. The second layer was an imbricating layer of 0 monocryl with good hemostasis assured. The pelvis was washed with warm normal saline. Good hemostasis was again reassured. The paracolic gutters were washed with warm normal saline and the uterus was returned to the abdomen. Good hemostasis was again reassured throughout. The fascia was closed with 0 Vicryl in a running fashion. Good hemostasis was assured. The skin was closed with a 4-0 monocryl  in a subcuticular fashion. The patient tolerated the procedure well. Sponge, lap, and needle counts were correct times two and the patient was taken to recovery in stable condition.     Cord Blood Results:  Information for the patient's :  Alfredo Aguayo [[id number]]   No results found for: PCTABR, PCTDIG, BILI, ABORH   No results found for: APH, APCO2, APO2, AHCO3, ABEC, ABDC, O2ST, SITE, RSCOM     Prenatal Labs:  Lab Results   Component Value Date/Time    ABO/Rh(D) O POSITIVE 2023 05:03 PM    HBsAg, External negative 2022 12:00 AM    HIV, External negative 2022 12:00 AM    Rubella, External immune 2022 12:00 AM    RPR, External non reactive 2022 12:00 AM    Gonorrhea, External negative 2022 12:00 AM    Chlamydia, External negative 2022 12:00 AM    GrBStrep, External negative 2023 12:00 AM

## 2023-02-15 NOTE — PROGRESS NOTES
Event note  Notified by RN that pt was going to get a spinal due to the epidural not adequately working. Called back when pt was being prepped and I immediately came to the OR. On arrival notified anesthesia was trying to determine if pt received a high spinal vs having a seizure from lidocaine toxicity. Immediately had a sterile cover put on the fetal monitoring and FHT was reassuring 170s. Additional anesthesia support called and I asked Dr Bre Turner to bedside to assist with stat CS. Pt was awake but not responsive and requiring O2 support. Proceeded with an urgent CS after testing the patient without any pain response to proceeding with a pfannenstiel incision as usual protocol. Rest of CS as the op note outlines. The pt awakened comfortably during the CS and was appropriately talking to anesthesia team.  The pt was updated by anesthesiologist and then myself after the procedure.

## 2023-02-15 NOTE — ANESTHESIA PROCEDURE NOTES
Spinal Block    Start time: 2/15/2023 9:18 AM  Performed by: Amber Gonsales MD  Authorized by: Amber Gonsales MD     Pre-procedure:   Indications: primary anesthetic  Preanesthetic Checklist: patient identified, risks and benefits discussed, site marked, patient being monitored and timeout performed    Timeout Time: 09:18 EST      Spinal Block:   Patient Position:  Seated  Prep Region:  Lumbar  Prep: chlorhexidine and patient draped      Location:  L3-4  Technique:  Single shot  Local: fentaNYL citrate (PF) Intrathecal - Intrathecal   15 mcg - 2/15/2023 9:18:00 AM  morphine (PF) 1 mg/mL Intrathecal - Intrathecal   0.15 mg - 2/15/2023 9:18:00 AM  bupivacaine (PF) (MARCAINE) 0.75 % (7.5 mg/mL) Intrathecal - Intrathecal   10 mg - 2/15/2023 9:18:00 AM  Local Dose (mL):  1.5  Med Admin Time: 2/15/2023 9:18 AM    Needle:   Needle Type:  Pencil-tip  Needle Gauge:  25 G  Attempts:  1      Events: CSF confirmed, no blood with aspiration and no paresthesia        Assessment:  Insertion:  Uncomplicated  Patient tolerance:  Patient tolerated the procedure well with no immediate complications  Epidural not getting complete coverage, removed and placed spinal

## 2023-02-15 NOTE — PROGRESS NOTES
6497 - Notified Dr Braswell Flavors that pt is about to receive spinal anesthesia in the OR; she states \"OK, call me when you're ready for me to come. \"

## 2023-02-15 NOTE — PROGRESS NOTES
Labor Progress Note  Patient seen, fetal heart rate and contraction pattern evaluated, patient examined. In to assume care of pt at 830AM. Signout from midwife to [facility] team this morning was via a text that stated pt was still the same with swollen cervix. In to see pt and room with three family members at this time. Pt exhausted and requests a CS for delivery. Has some pain in RLQ. Patient Vitals for the past 1 hrs:   BP Temp Pulse Resp SpO2   02/15/23 0755 120/72 98.2 °F (36.8 °C) (!) 121 18 99 %       Physical Exam:  Cervical Exam:  /-2 anterior  Membranes:  prior SROM  Uterine Activity: ctx every 2-3, pit at 12  Fetal Heart Rate: 170s mod +accels no decels    Assessment/Plan:  21yo  at 41w0d here for IOL due to postdates. Has had minimal cervical change since overnight despite regular ctx on pitocin. Baby feels in a good position and did offer pt an IUPC to confirm adequacy but pt declines and requests a CS. Tm 99 and received tylenol at 5am. Pt with tachycardia and baby with 20 minutes of fetal tachycardia during assessment now which is improving. Warrants close evaluation for development of chorioamnionitis or additional antibiotics postoperatively if pt has a fever. Asked to proceed urgently given failure to progress and fht. Ancef, azithromycin now. Reviewed r/b/a to CS. Allowed family to ask questions.

## 2023-02-15 NOTE — PROGRESS NOTES
8780 Bedside and Verbal shift change report given to FINESSE Joe RN  (oncoming nurse) by Ian Dandy RN  (offgoing nurse). Report included the following information SBAR, Kardex, Procedure Summary, Intake/Output, MAR, and Recent Results. 0800 Dr Marcia Parekh phoned in. Updated with last fetal assessment and cervical check. States she will be at the bedside in 20-30 minutes. 200 Dr. Marcia Parekh at bedside. Reviewed fetal monitor strip. SVE 6-7 - no change. Discussing plan of care. Patient in agreement to proceed with c/section    0840 C/section     0901 Patient transferred via labor bed to OR 1    0937 Delivered liveborn female by Dr. Marcia Parekh via primary c/section. 1013 Patient transferred via hospital bed to LD 6 for recovery. 1132 Lipid infusion completed. 200 Dr. Marcello Humphrey at bedside assessing patient. Patient to remain in LD for 1 hour post completion of lipids. 1133 Severe range /103. Will reassess in 15 minutes.

## 2023-02-15 NOTE — PROGRESS NOTES
Bedside and Verbal shift change report given to Celio Gallagher RN (oncoming nurse) by VALENCIA Acevedo RN (offgoing nurse). Report included the following information SBAR, Kardex, Procedure Summary, Intake/Output, MAR, Recent Results, Alarm Parameters , Pre Procedure Checklist, Procedure Verification, and Quality Measures. 1740: Pt on left side w/ peanut ball, Tburg. 2025Gaspnikos Walsh CNM at bedside. Discussing plan of care. 2030: [place] placed. 350mL yellow/straw, clear urine. Right side w/ peanut ball. 2045: IV fluid bolus d/t tachy FHR. Temp 98.5 oral.     2140: Decreased pit, 10.     2227: K. Tisha Abigail CNM at bedside. SVE 6-7/100/0.     2230: Pt left side, exaggerated runners. Requesting epidural re dose. Kevin Kenney MD.    4052Alex Lowe MD at bedside. Epidural re dose.    2300: Pit restarted, 2. Tylenol 1000mg given. 2350: Left side w/ peanut ball. 0140: Right side exaggerated runners w/ peanut ball, Tburg. 0250: Left side. 3209: Right side. 5320: Left side exaggerated runners w/ peanut ball, Tburg. 0515: SVE 6/100/+1, KELSEA Castillo RN. 0540: Spinning. 5195: Knee chest.    0646: Trell Panda right side. 5230Glenora Naa left side. 9019: SVE 6-7/100/+1. No change w/ swollen cervix. CNM notified. Bedside and Verbal shift change report given to FINESSE Ashton RN (oncoming nurse) by Celio Gallagher RN (offgoing nurse). Report included the following information SBAR, Kardex, Procedure Summary, Intake/Output, MAR, Recent Results, Pre Procedure Checklist, Procedure Verification, and Quality Measures.

## 2023-02-15 NOTE — PROGRESS NOTES
CNM Labor Progress Note     Patient: Olivia Miller MRN: [id number]  SSN: [id number]    YOB: 2000  Age: 25 y.o. Sex: female        Subjective:   Patient comfortable with epidural. Rn have been repositioning and using spinning babies to help with baby position. SVE by RN. Objective:   No data found. Cervical Exam: 6 cm dilated    Swollen % effaced    -1 station    Membranes:   SROM for 18hrs    Current intervetions: reposition, rest  Pain management: epidural  Pitocin @ 12 miliunits/hr      Assessment:   Fetal Heart Rate: Baseline: 150 per minute  Variability: moderate  Accelerations: yes  Decelerations: none    Contractions:  External  Frequency: 3-4min  Duration: 60-90  Strength: moderate    Intrauterine pregnancy at term  Category 1 fetal heart rate tracing         Plan:   Continue current orders/management   [facility] notified of overnight care.          Huber De Guzman

## 2023-02-15 NOTE — PROGRESS NOTES
ARNALDO Labor Progress Note     Patient: Petr Willard MRN: [id number]  SSN: [id number]    YOB: 2000  Age: 25 y.o. Sex: female        Subjective:   Patient seen by ARNALDO. She is comfortable with epidural. Family at bedside supportive. Objective:   Patient Vitals for the past 4 hrs:   Temp Pulse Resp BP SpO2   02/14/23 1938 98.5 °F (36.9 °C) 82 14 129/69 100 %   02/14/23 1906 -- 95 18 129/74 100 %   02/14/23 1818 97.9 °F (36.6 °C) 85 18 127/71 100 %   02/14/23 1658 -- 75 -- 134/75 100 %       Cervical Exam: defer  Membranes:  Spontaneous Rupture of Membranes; Amniotic Fluid: clear fluid    Current intervetions: reposition, garcia cath placed, resting  Pain management: epidural  Pitocin @ 19 miliunits/hr      Assessment:   Fetal Heart Rate: Baseline: 160 per minute  Variability: moderate  Accelerations: yes  Decelerations: none    Contractions:  External  Frequency: 3-4min  Duration: 50-80  Strength: moderate    Intrauterine pregnancy at term  Category 1 fetal heart rate tracing         Plan:     Introduced self to patient,   CNM management. Continue Pitocin titrate.    Recheck in 2-4 hours    Maternal and fetal monitoring per protocol  Roma Stovall CNM

## 2023-02-15 NOTE — PROGRESS NOTES
Postop Check  S: sore, but doing ok. Denies dizziness, lightheadedness. No headache/blurry vision/n/v. Hasn't moved around yet. O: Patient Vitals for the past 4 hrs:   Temp Pulse Resp BP SpO2   02/15/23 1203 -- 94 -- (!) 155/73 --   02/15/23 1200 -- 97 -- -- --   02/15/23 1148 -- 94 -- (!) 151/88 --   02/15/23 1133 -- 85 -- (!) 162/103 --   02/15/23 1118 -- 92 16 (!) 153/81 --   02/15/23 1103 -- 96 16 (!) 140/86 97 %   02/15/23 1048 -- 96 16 (!) 147/89 99 %   02/15/23 1032 -- (!) 110 18 (!) 144/65 --   02/15/23 1028 -- (!) 110 16 (!) 146/66 --   02/15/23 1027 -- (!) 113 18 133/84 99 %   02/15/23 1022 -- (!) 114 -- 133/83 --   02/15/23 1017 98.4 °F (36.9 °C) (!) 113 16 134/82 95 %       Gen NAD AAOx3, awake conversing, bonding with baby  Abd soft appropriately tender, fundus firm and below umbilicus  Dressing dry  LE SCDs, moving well and no edema b/l    A/P: Postop from primary CS for failure to progress active phase, fetal tachycardia complicated by lidocaine toxicity after spinal anesthesia. Has been off lipid treatment per anesthesia management for one hour. Now presenting since delivery with mild-severe range BPs without evidence of severe symptoms with preeclampsia. Recommend continued monitoring on L&D. Check cbc, cmp, uric acid and urine protein-creatinine ratio. Keep garcia at this time.

## 2023-02-15 NOTE — ANESTHESIA POSTPROCEDURE EVALUATION
Post-Anesthesia Evaluation and Assessment    Patient: Miladys Estrada MRN: [id number]  SSN: [id number]    YOB: 2000  Age: 25 y.o. Sex: female      I have evaluated the patient and they are stable and ready for discharge from the PACU. Cardiovascular Function/Vital Signs  Visit Vitals  BP (!) 155/73   Pulse 94   Temp 36.9 °C (98.4 °F)   Resp 16   Ht 5' 2\" (1.575 m)   Wt 81.6 kg (180 lb)   SpO2 97%   Breastfeeding Unknown   BMI 32.92 kg/m²       Patient is status post Epidural anesthesia for Procedure(s):   SECTION. Nausea/Vomiting: None    Postoperative hydration reviewed and adequate. Pain:  Pain Scale 1: Numeric (0 - 10) (02/15/23 1103)  Pain Intensity 1: 8 (02/15/23 1103)   Managed    Neurological Status:   Neuro (WDL): Within Defined Limits (02/15/23 1017)   At baseline    Mental Status, Level of Consciousness: Alert and  oriented to person, place, and time    Pulmonary Status:   O2 Device: None (02/15/23 1027)   Adequate oxygenation and airway patent    Complications related to anesthesia: LAST requring intralipid    Post-anesthesia assessment completed. No continued signs of LAST, safe for floor admission.  No concerns    Signed By: Dianne Perez MD     February 15, 2023

## 2023-02-15 NOTE — PROGRESS NOTES
CNM Labor Progress Note     Patient: Ollie Meza MRN: [id number]  SSN: [id number]    YOB: 2000  Age: 25 y.o. Sex: female        Subjective:   Patient request SVE. Fetal tachycardia noted, no maternal temp. Pt is having some mild cramping on left pelvis. Pitocin stopped due to fetal tachycardia by RN. Will plan to give tums and restart after 2 hours. Discussed plan of care with patient. Objective:   Patient Vitals for the past 4 hrs:   Temp Pulse Resp BP SpO2   02/14/23 2045 98.5 °F (36.9 °C) -- -- -- --   02/14/23 1938 98.5 °F (36.9 °C) 82 14 129/69 100 %   02/14/23 1906 -- 95 18 129/74 100 %       Cervical Exam: 6 cm dilated    100% effaced    -1 station    Membranes:  Spontaneous Rupture of Membranes; Amniotic Fluid: clear fluid, bloody show noted    Current intervetions: reposition, epidural bolus  Pain management: epidural        Assessment:   Fetal Heart Rate: Baseline: 170 per minute  Variability: moderate  Accelerations: yes  Decelerations: none    Contractions:  External/  Frequency: 3-5min  Duration: 60-80  Strength: moderate    Intrauterine pregnancy at term  Category 1 fetal heart rate tracing         Plan:   Continue current orders/management   CNM management   Tylenol po now and tums now  Restart Pitocin at 2300. Re-evaluate in 4 hours or prn.       Magalis Mcmanus

## 2023-02-15 NOTE — PROGRESS NOTES
1214 - Per Dr. Von Wild, after one hour off of the lipids patient is ok to be moved to [facility] [place].

## 2023-02-16 LAB
ALBUMIN SERPL-MCNC: 1.8 G/DL (ref 3.5–5)
ALBUMIN/GLOB SERPL: 0.5 (ref 1.1–2.2)
ALP SERPL-CCNC: 144 U/L (ref 45–117)
ALT SERPL-CCNC: 16 U/L (ref 12–78)
ANION GAP SERPL CALC-SCNC: 10 MMOL/L (ref 5–15)
AST SERPL-CCNC: 29 U/L (ref 15–37)
BASOPHILS # BLD: 0 K/UL (ref 0–0.1)
BASOPHILS NFR BLD: 0 % (ref 0–1)
BILIRUB SERPL-MCNC: 0.3 MG/DL (ref 0.2–1)
BUN SERPL-MCNC: 9 MG/DL (ref 6–20)
BUN/CREAT SERPL: 10 (ref 12–20)
CALCIUM SERPL-MCNC: 7.8 MG/DL (ref 8.5–10.1)
CHLORIDE SERPL-SCNC: 109 MMOL/L (ref 97–108)
CO2 SERPL-SCNC: 21 MMOL/L (ref 21–32)
CREAT SERPL-MCNC: 0.92 MG/DL (ref 0.55–1.02)
DIFFERENTIAL METHOD BLD: ABNORMAL
EOSINOPHIL # BLD: 0 K/UL (ref 0–0.4)
EOSINOPHIL NFR BLD: 0 % (ref 0–7)
ERYTHROCYTE [DISTWIDTH] IN BLOOD BY AUTOMATED COUNT: 17 % (ref 11.5–14.5)
GLOBULIN SER CALC-MCNC: 3.4 G/DL (ref 2–4)
GLUCOSE SERPL-MCNC: 85 MG/DL (ref 65–100)
HCT VFR BLD AUTO: 24.8 % (ref 35–47)
HGB BLD-MCNC: 8.1 G/DL (ref 11.5–16)
IMM GRANULOCYTES # BLD AUTO: 0 K/UL
IMM GRANULOCYTES NFR BLD AUTO: 0 %
LYMPHOCYTES # BLD: 2.1 K/UL (ref 0.8–3.5)
LYMPHOCYTES NFR BLD: 8 % (ref 12–49)
Lab: NORMAL
MCH RBC QN AUTO: 25.7 PG (ref 26–34)
MCHC RBC AUTO-ENTMCNC: 32.7 G/DL (ref 30–36.5)
MCV RBC AUTO: 78.7 FL (ref 80–99)
MONOCYTES # BLD: 1.3 K/UL (ref 0–1)
MONOCYTES NFR BLD: 5 % (ref 5–13)
NEUTS BAND NFR BLD MANUAL: 3 % (ref 0–6)
NEUTS SEG # BLD: 22.7 K/UL (ref 1.8–8)
NEUTS SEG NFR BLD: 84 % (ref 32–75)
NRBC # BLD: 0 K/UL (ref 0–0.01)
NRBC BLD-RTO: 0 PER 100 WBC
PLATELET # BLD AUTO: 202 K/UL (ref 150–400)
PMV BLD AUTO: 10.8 FL (ref 8.9–12.9)
POTASSIUM SERPL-SCNC: 3.6 MMOL/L (ref 3.5–5.1)
PROT SERPL-MCNC: 5.2 G/DL (ref 6.4–8.2)
RBC # BLD AUTO: 3.15 M/UL (ref 3.8–5.2)
RBC MORPH BLD: ABNORMAL
RBC MORPH BLD: ABNORMAL
REFERENCE LAB,REFLB: NORMAL
SODIUM SERPL-SCNC: 140 MMOL/L (ref 136–145)
TEST DESCRIPTION:,ATST: NORMAL
WBC # BLD AUTO: 26.1 K/UL (ref 3.6–11)

## 2023-02-16 PROCEDURE — 80053 COMPREHEN METABOLIC PANEL: CPT

## 2023-02-16 PROCEDURE — 74011000250 HC RX REV CODE- 250: Performed by: OBSTETRICS & GYNECOLOGY

## 2023-02-16 PROCEDURE — 85025 COMPLETE CBC W/AUTO DIFF WBC: CPT

## 2023-02-16 PROCEDURE — 74011250636 HC RX REV CODE- 250/636: Performed by: OBSTETRICS & GYNECOLOGY

## 2023-02-16 PROCEDURE — 74011250637 HC RX REV CODE- 250/637: Performed by: OBSTETRICS & GYNECOLOGY

## 2023-02-16 PROCEDURE — 36415 COLL VENOUS BLD VENIPUNCTURE: CPT

## 2023-02-16 PROCEDURE — 65410000002 HC RM PRIVATE OB

## 2023-02-16 RX ORDER — LANOLIN ALCOHOL/MO/W.PET/CERES
1 CREAM (GRAM) TOPICAL
Status: DISCONTINUED | OUTPATIENT
Start: 2023-02-17 | End: 2023-02-18 | Stop reason: HOSPADM

## 2023-02-16 RX ADMIN — DOCUSATE SODIUM 100 MG: 100 CAPSULE, LIQUID FILLED ORAL at 01:37

## 2023-02-16 RX ADMIN — KETOROLAC TROMETHAMINE 15 MG: 30 INJECTION, SOLUTION INTRAMUSCULAR; INTRAVENOUS at 09:06

## 2023-02-16 RX ADMIN — IBUPROFEN 800 MG: 400 TABLET, FILM COATED ORAL at 15:04

## 2023-02-16 RX ADMIN — SODIUM CHLORIDE, PRESERVATIVE FREE 10 ML: 5 INJECTION INTRAVENOUS at 15:04

## 2023-02-16 RX ADMIN — OXYCODONE HYDROCHLORIDE 10 MG: 5 TABLET ORAL at 22:10

## 2023-02-16 RX ADMIN — ZOLPIDEM TARTRATE 5 MG: 5 TABLET ORAL at 01:37

## 2023-02-16 RX ADMIN — DOCUSATE SODIUM 100 MG: 100 CAPSULE, LIQUID FILLED ORAL at 09:05

## 2023-02-16 RX ADMIN — OXYCODONE HYDROCHLORIDE 5 MG: 5 TABLET ORAL at 05:52

## 2023-02-16 RX ADMIN — DOCUSATE SODIUM 100 MG: 100 CAPSULE, LIQUID FILLED ORAL at 21:11

## 2023-02-16 RX ADMIN — OXYCODONE HYDROCHLORIDE 10 MG: 5 TABLET ORAL at 09:50

## 2023-02-16 RX ADMIN — OXYCODONE HYDROCHLORIDE 10 MG: 5 TABLET ORAL at 15:59

## 2023-02-16 RX ADMIN — KETOROLAC TROMETHAMINE 15 MG: 30 INJECTION, SOLUTION INTRAMUSCULAR; INTRAVENOUS at 01:37

## 2023-02-16 RX ADMIN — LABETALOL HYDROCHLORIDE 200 MG: 200 TABLET, FILM COATED ORAL at 21:11

## 2023-02-16 RX ADMIN — LABETALOL HYDROCHLORIDE 200 MG: 200 TABLET, FILM COATED ORAL at 09:05

## 2023-02-16 NOTE — PROGRESS NOTES
Verbal shift change report given to FRANSISCA Christina RN (oncoming nurse) by Easton Black. Rosmery Rosen RN (offgoing nurse). Report included the following information SBAR, Kardex, Intake/Output, and MAR.       Maryan Dubin, RN

## 2023-02-16 NOTE — PROGRESS NOTES
Post-Operative Day Number 1 Progress Note    Patient doing well post-op day 1 from  delivery without significant complaints. Pain controlled on current medication. Voiding without difficulty, normal lochia. Sore with moving around but no acute pain. No flatus yet. Vitals:  Patient Vitals for the past 8 hrs:   BP Temp Pulse Resp SpO2   23 0858 (!) 152/84 98.2 °F (36.8 °C) 95 16 98 %   23 0550 124/84 97.7 °F (36.5 °C) 92 16 97 %     Temp (24hrs), Av.8 °F (37.1 °C), Min:97.7 °F (36.5 °C), Max:99.7 °F (37.6 °C)          Exam:  Patient without distress. Abdomen soft, fundus firm at level of umbilicus, non tender. Incision dry and clean without erythema. Lower extremities are negative for swelling, cords or tenderness. Lab/Data Review:  Recent Results (from the past 12 hour(s))   CBC WITH AUTOMATED DIFF    Collection Time: 23  6:05 AM   Result Value Ref Range    WBC 26.1 (H) 3.6 - 11.0 K/uL    RBC 3.15 (L) 3.80 - 5.20 M/uL    HGB 8.1 (L) 11.5 - 16.0 g/dL    HCT 24.8 (L) 35.0 - 47.0 %    MCV 78.7 (L) 80.0 - 99.0 FL    MCH 25.7 (L) 26.0 - 34.0 PG    MCHC 32.7 30.0 - 36.5 g/dL    RDW 17.0 (H) 11.5 - 14.5 %    PLATELET 207 767 - 012 K/uL    MPV 10.8 8.9 - 12.9 FL    NRBC 0.0 0  WBC    ABSOLUTE NRBC 0.00 0.00 - 0.01 K/uL    NEUTROPHILS 84 (H) 32 - 75 %    BAND NEUTROPHILS 3 0 - 6 %    LYMPHOCYTES 8 (L) 12 - 49 %    MONOCYTES 5 5 - 13 %    EOSINOPHILS 0 0 - 7 %    BASOPHILS 0 0 - 1 %    IMMATURE GRANULOCYTES 0 %    ABS. NEUTROPHILS 22.7 (H) 1.8 - 8.0 K/UL    ABS. LYMPHOCYTES 2.1 0.8 - 3.5 K/UL    ABS. MONOCYTES 1.3 (H) 0.0 - 1.0 K/UL    ABS. EOSINOPHILS 0.0 0.0 - 0.4 K/UL    ABS. BASOPHILS 0.0 0.0 - 0.1 K/UL    ABS. IMM.  GRANS. 0.0 K/UL    DF MANUAL      RBC COMMENTS ANISOCYTOSIS  1+        RBC COMMENTS MICROCYTOSIS  1+       METABOLIC PANEL, COMPREHENSIVE    Collection Time: 23  6:05 AM   Result Value Ref Range    Sodium 140 136 - 145 mmol/L    Potassium 3.6 3.5 - 5.1 mmol/L    Chloride 109 (H) 97 - 108 mmol/L    CO2 21 21 - 32 mmol/L    Anion gap 10 5 - 15 mmol/L    Glucose 85 65 - 100 mg/dL    BUN 9 6 - 20 MG/DL    Creatinine 0.92 0.55 - 1.02 MG/DL    BUN/Creatinine ratio 10 (L) 12 - 20      eGFR >60 >60 ml/min/1.73m2    Calcium 7.8 (L) 8.5 - 10.1 MG/DL    Bilirubin, total 0.3 0.2 - 1.0 MG/DL    ALT (SGPT) 16 12 - 78 U/L    AST (SGOT) 29 15 - 37 U/L    Alk. phosphatase 144 (H) 45 - 117 U/L    Protein, total 5.2 (L) 6.4 - 8.2 g/dL    Albumin 1.8 (L) 3.5 - 5.0 g/dL    Globulin 3.4 2.0 - 4.0 g/dL    A-G Ratio 0.5 (L) 1.1 - 2.2           Assessment and Plan:  POD#1 s/p primary CS for failed active phase and fetal tachycardia. Complicated by lidocaine toxicity treated with lipids. PP course then complicated by mild-severe range BPs suspected due to the lipid treatment and anticipated to resolve spontaneously. UPCR was negative. Pt without complaint of preeclampsia. BP well managed on labetalol 200 BID. Lab abnormalities yesterday in setting of recent lipid administration - now all  improved after IVF overnight. Iron deficiency anemia now with acute blood loss anemia hgb 8 - for iron treatment. Pt asymptomatic. Reviewed in detail with pt and her dad at bedside all of the events of yesterday, treatment and anticipated recovery. Will need follow up appt with Dr Irene Tavarez in 1-2 weeks for incision check and BP check.     Baby girl/benign labs

## 2023-02-16 NOTE — PROGRESS NOTES
1700-TRANSFER - IN REPORT:    Verbal report received from [facility] on [place] [facility]  being received from (unit) for routine progression of care      Report consisted of patients Situation, Background, Assessment and   Recommendations(SBAR). Information from the following report(s) SBAR, Kardex, Intake/Output, MAR, and Recent Results was reviewed with the receiving nurse. Opportunity for questions and clarification was provided. Assessment completed upon patients arrival to unit and care assumed. 1910-Per Dr. Casey Olmstead, remove garcia, run IVFs over night and will be addressed in AM, order toradol 15 mg Q6h for 24 hours, order a CMP and CBC, discontinue BMP.

## 2023-02-16 NOTE — ADT AUTH CERT NOTES
Comments  Comment          Patient Demographics    Patient Name   Azalia Martinez   [id number] Legal Sex   Female    2000 Address   [address] [address] Phone   [phone] (Home)     CSN:   [id number]     [address] [place] [address] Date: Admit Time Room Bed   2023  4:12 PM [place] [[id number]] 01 [[id number]]     Attending Providers    Provider Pager From To   Lauren Jain MD  23      Patient Contacts    Name Relation Home Work Mobile   Vineet Cho Father [phone]       Utilization Reviews       IOL by Hair Schultz RN       Review Entered Review Status   2023 1417 In Primary      Criteria Review   Ms. Lavonne Emerson is admitted with pregnancy at 40w5d for induction of labor due to late term. Prenatal course was complicated by  anemia (hasn't been taking iron as advised) and elevated WBC which has declined with serial monitoring (17.7- 15.6- 14.7)      Physical Exam:  Patient without distress. Abdomen: soft, nontender  Cervical Exam: Closed/Thick/High (at last exam on 23)  Lower Extremities:  - Edema No  EFW 7#  Membranes:  Intact  Fetal Heart Rate: pending upon admission           Prenatal Labs:   O+/RI/GBS neg     Impression/Plan:     Plan: Admit for induction of labor. Group B Strep negative. Plan miso tonight and placement of balloon if possible. Additional Notes           H&P Notes     H&P by Lauren Jani MD at 23 167 documented on Admission (Current) from 2023 in [address] INFANT    Author: Lauren Jain MD Author Type: Physician Filed: 23 3531   Note Status: Signed Cosign: Cosign Not Required Date of Service: 23   : Lauren Jain MD (Physician)               History & Physical     Name: Veena Trimble MRN: [id number]  SSN: [id number]    YOB: 2000  Age: 25 y.o. Sex: female       Subjective: Induction      Estimated Date of Delivery: None noted.   OB History            1    Para        Term         AB        Living               SAB        IAB        Ectopic        Molar        Multiple        Live Births                       Ms. Margarita Tapia is admitted with pregnancy at 40w5d for induction of labor due to late term. Prenatal course was complicated by  anemia (hasn't been taking iron as advised) and elevated WBC which has declined with serial monitoring (17.7- 15.6- 14.7) . Please see prenatal records for details. PMH: lichen planus, eczema  PSH: none  Social History           Occupational History    Not on file   Tobacco Use    Smoking status: Not on file    Smokeless tobacco: Not on file   Substance and Sexual Activity    Alcohol use: Not on file    Drug use: Not on file    Sexual activity: Not on file      No family history on file. Not on File  Prior to Admission medications    Not on File    NKDA     Review of Systems: Pertinent items are noted in the History of Present Illness. Objective:      Vitals: There were no vitals filed for this visit. Physical Exam:  Patient without distress. Abdomen: soft, nontender  Cervical Exam: Closed/Thick/High (at last exam on 23)  Lower Extremities:  - Edema No  EFW 7#  Membranes:  Intact  Fetal Heart Rate: pending upon admission           Prenatal Labs:   O+/RI/GBS neg     Impression/Plan:      Plan: Admit for induction of labor. Group B Strep negative. Plan miso tonight and placement of balloon if possible.      Signed By:  Anita Bradley MD      2023           Comments  Comment          To print report, click blue 'Print' hyperlink at right    Report Name Print   Delivery:Mom [address] [facility]          [address] [place] [address]  [phone]       Patient: Josiah Last  MRN: [id number]  :2000      Ottoniel Luther     MRN: [id number]     Link to Baby  Comment        [de-identified] name MRN Account  Age Sex Admission Type    Leandro Tovar [id number] 2/15/23 0 days F Confirmed Admission - L&D [place]     OB History       1    Para   1    Term   1            AB        Living   1      SAB        IAB        Ectopic        Molar        Multiple   0    Live Births   1         # Outcome Date GA Labor/2nd Weight Sex Delivery Anes PTL Lv A1 A5   1 Term 02/15/23 41w0d  3.805 kg F CS-LTranv Epidural N Living 5 9   Name: Liz Kulkarni   Complications: Failure to Progress in First Stage   Location: Other   Delivering Clinician: Yifan Leon MD        Prenatal History    Flowsheet Row Most Recent Value   Did You Receive Prenatal Care Yes   Name Of [facility] Provider Dr Diana Dodson [facility] (Maternal Fetal Medicine)? No   Fetal Ultrasound Abnormalities/Concerns? No   Infant Feeding Formula   Circumcision Planned No   Pediatrician After Birth/ Follow Up Baby Visits Salvador Saliva ( [employer])     Dating Summary    Working LILIAN: 23 set by Leonardo Vaughn RN on 23 based on Other Basis     Based On LILIAN GA Diff GA User Date   Other Basis 23 Working  Leonardo Vaughn RN 23     Prenatal Results    Prenatal Labs    Test Value Date Time   ABO/Rh * O positive  22    Antibody Scrn      Hgb      Hct      Platelets      Rubella * immune  22    RPR * non reactive  22    T.  Pallidum Antibody      Urine      Hep B Surf Ag * negative  22    Hep C      HIV * negative  22    Gonorrhea * negative  22    Chlamydia * negative  22    TSH      GTT, 1 HR (Glucola)      GTT, Fasting      GTT, 1 HR      GTT, 2 HR      GTT, 3 HR        3rd Trimester    Test Value Date Time   Hgb      Hct      Platelets      Group B Strep * negative  23    Antibody Scrn      TSH      T. Pallidum Antibody      Hep B Surf Ag      Gonorrhea      Chlamydia       Screening/Diagnostics    Test Value Date Time   AFP Only      AFP Tetra      Hgb Electrophoresis      Amniocentesis      Cystic Fibrosis      Thalessemia      Brian-Sachs      [place] PAP Smear      Beta HCG      NT      NIPT      COVID-19        Lab    Test Value Date Time   GTT, Fasting      GTT, 1HR      GTT, 2HR      GTT, 3HR      RPR * non reactive  22    Beta HCG      CMV Ab      Toxoplasma Ab      Varicella Zoster Ab         Legend    *: Historical  View all results for this pregnancy     Caio Thurmna [[id number]]   Delivery    Birth date/time: 2/15/2023 09:37:00  Delivery type: , Low Transverse   categorization: Primary   priority: Emergency  Indications for : Fetal Intolerance of Labor, Failure to Progress  Complications: Failure to Progress in First Stage  Labor Events     labor?: No   steroids?: None  Rupture date/time: 2023 1347  Rupture type: SROM  Fluid color: Clear  Fluid odor: None  Trial of labor?: Yes  Cervical ripening: [place]/EASI  Induction: Oxytocin  First cervical ripening date/time: 2023 1815  Induction indications: Post-term Gestation  Complications: Failure to Progress in First Stage  Delivery Providers    Delivering clinician: Zafar Turcios MD  Provider Role   Zafar Turcios MD Obstetrician   Julio Gary, ALISA Primary Nurse   Yary Núñez RN Primary [place] Nurse    NICU Nurse    Neonatologist   Jatinder Guerrero MD Anesthesiologist   Nettie Jorge, CRNA CRNA    Nurse Practitioner    Midwife    Nursery Nurse     Anesthesia    Method: Epidural  Multiple Birth Onset Second Stage    No data filed  Operative Delivery    Forceps attempted?: No  Vacuum extractor attempted?: No  Presentation    Presentation: Vertex  Apgars    Living status: Living  Apgars:  1 min. :  5 min.:  10 min. :  15 min.:  20 min.:    Skin color:  0  1       Heart rate:  2  2       Reflex irritability:  1  2       Muscle tone:  1  2       Respiratory effort:  1  2       Total:  5  9       Apgars assigned by: Zachery Dakin SMITH,NNP-BC  Resuscitation    Method: C-PAP, Suctioning-tracheal, Tactile Stimulation  Shoulder Dystocia    Shoulder dystocia present?: No  Measurements    Weight: 3805 g  Weight (lbs): 8 lb 6.2 oz  Length: 54.6 cm  Length (in): 21.5\"  Head circumference: 34 cm  Abdominal girth: 35 cm  Lacerations    Episiotomy: None    Lacerations: None  Repair Needed: None  # of Repair Packets:   Suture Type and Size:   Suture Comment:   Estimated Blood Loss (mL):       Placenta    Placenta Date/Time: 2/15/2023 0939  Removal: Manual Removal  Appearance: Normal Placenta disposition: Discarded     Vaginal Counts    Skin to Skin    Skin to skin initiated date/time: 2/15/2023 1020  Skin to skin with:  Mother  Delivery Summary History    Event User Date/Time   Signed Santo Ontiveros RN 2/15/2023 10:50:06   Opened for Addendum Giovanna Macias 2/15/2023 10:53:10   Signed Santo Ontiveros RN 2/15/2023 11:51:59

## 2023-02-16 NOTE — ROUTINE PROCESS
1930: Bedside and Verbal shift change report given to Pastora España RN (oncoming nurse) by Adrien Dorman RN (offgoing nurse). Report included the following information SBAR, Kardex, ED Summary, OR Summary, Procedure Summary, Intake/Output, MAR, and Recent Results. 1940: RN to bedside to meet pt, attempt to assist pt to bathroom and remove garcia- pt declined at this time. RN educated pt on importance of movement and garcia removal d/t infection risk. Pt wishes to remove garcia at ' latest possible time or in the morning' pt agreeable to removing garcia at 2130- 12 hours post c/s.      2140: Pt complaining of increased burning around IV site. IV currently running LR 100mL/hr. No s/x of infiltration. Skin warm to touch, no redness. 2145: Pt continue complaining of increased burning. Pt educated on use of IV and options discussed. Pt would like new IV placed d/t burning discomfort. 2150: New IV placed, 20G in R hand- LR running at 100mL/hr.      2235: Per Dr. Joslyn Ricketts, okay to give PO david in addition to IV toradol d/t pts report of increased pain.

## 2023-02-16 NOTE — ROUTINE PROCESS
Bedside shift change report given to Dayne Johnson RN (oncoming nurse) by Irene Martinez RN (offgoing nurse). Report included the following information SBAR.

## 2023-02-17 PROCEDURE — 65410000002 HC RM PRIVATE OB

## 2023-02-17 PROCEDURE — 74011250637 HC RX REV CODE- 250/637: Performed by: OBSTETRICS & GYNECOLOGY

## 2023-02-17 RX ADMIN — OXYCODONE HYDROCHLORIDE 10 MG: 5 TABLET ORAL at 05:03

## 2023-02-17 RX ADMIN — LABETALOL HYDROCHLORIDE 200 MG: 200 TABLET, FILM COATED ORAL at 20:47

## 2023-02-17 RX ADMIN — IBUPROFEN 800 MG: 400 TABLET, FILM COATED ORAL at 17:40

## 2023-02-17 RX ADMIN — DOCUSATE SODIUM 100 MG: 100 CAPSULE, LIQUID FILLED ORAL at 09:10

## 2023-02-17 RX ADMIN — IBUPROFEN 800 MG: 400 TABLET, FILM COATED ORAL at 09:10

## 2023-02-17 RX ADMIN — LABETALOL HYDROCHLORIDE 200 MG: 200 TABLET, FILM COATED ORAL at 09:10

## 2023-02-17 RX ADMIN — FERROUS SULFATE TAB 325 MG (65 MG ELEMENTAL FE) 325 MG: 325 (65 FE) TAB at 09:10

## 2023-02-17 NOTE — PROGRESS NOTES
Post-Operative Day Number 2 Progress Note    Patient doing well post-op day 1 from  delivery without significant complaints. Pain controlled on current medication. Voiding without difficulty, normal lochia.  +flatus  Denies HA, changes in vision    Vitals:  Patient Vitals for the past 8 hrs:   BP Temp Pulse Resp SpO2   23 0503 134/82 97.9 °F (36.6 °C) 99 16 98 %       Temp (24hrs), Av.2 °F (36.8 °C), Min:97.9 °F (36.6 °C), Max:98.6 °F (37 °C)          Exam:  Patient without distress. Abdomen soft, fundus firm at level of umbilicus, non tender. Incision dry and clean without erythema covered with dressing               Lower extremities are negative for swelling, cords or tenderness. Lab/Data Review:  No results found for this or any previous visit (from the past 12 hour(s)). Assessment and Plan:  POD#2 s/p primary CS for failed active phase and fetal tachycardia. Complicated by lidocaine toxicity treated with lipids. PP course then complicated by mild-severe range BPs suspected due to the lipid treatment and anticipated to resolve spontaneously. UPCR was negative. Pt without complaints of preeclampsia. BP well managed on labetalol 200 BID. Lab abnormalities yesterday in setting of recent lipid administration - now all  improved after IVF overnight. Iron deficiency anemia now with acute blood loss anemia hgb 8 - for iron treatment. Pt asymptomatic. Will need follow up appt with Dr Deette Barthel in 1-2 weeks for incision check and BP check.     Baby girl/benign labs  Discharge tomorrow    Bladimir Teddy VELAZQUEZ   2023  7:28 AM

## 2023-02-17 NOTE — ROUTINE PROCESS
Bedside shift change report given to A Filomena Saenz RN (oncoming nurse) by Eveline Beal RN (offgoing nurse). Report included the following information SBAR.

## 2023-02-18 VITALS
RESPIRATION RATE: 16 BRPM | OXYGEN SATURATION: 97 % | TEMPERATURE: 98.6 F | WEIGHT: 180 LBS | SYSTOLIC BLOOD PRESSURE: 151 MMHG | HEART RATE: 82 BPM | BODY MASS INDEX: 33.13 KG/M2 | HEIGHT: 62 IN | DIASTOLIC BLOOD PRESSURE: 93 MMHG

## 2023-02-18 PROCEDURE — 74011250637 HC RX REV CODE- 250/637: Performed by: OBSTETRICS & GYNECOLOGY

## 2023-02-18 RX ORDER — LABETALOL 200 MG/1
200 TABLET, FILM COATED ORAL ONCE
Status: COMPLETED | OUTPATIENT
Start: 2023-02-18 | End: 2023-02-18

## 2023-02-18 RX ORDER — OXYCODONE HYDROCHLORIDE 5 MG/1
5 TABLET ORAL
Qty: 10 TABLET | Refills: 0 | Status: SHIPPED | OUTPATIENT
Start: 2023-02-18 | End: 2023-02-23

## 2023-02-18 RX ORDER — DOCUSATE SODIUM 100 MG/1
100 CAPSULE, LIQUID FILLED ORAL 2 TIMES DAILY
Qty: 60 CAPSULE | Refills: 2 | Status: SHIPPED | OUTPATIENT
Start: 2023-02-18 | End: 2023-05-19

## 2023-02-18 RX ORDER — LABETALOL 200 MG/1
400 TABLET, FILM COATED ORAL EVERY 12 HOURS
Status: DISCONTINUED | OUTPATIENT
Start: 2023-02-18 | End: 2023-02-18 | Stop reason: HOSPADM

## 2023-02-18 RX ORDER — ACETAMINOPHEN 325 MG/1
1000 TABLET ORAL
Qty: 60 TABLET | Refills: 1 | Status: SHIPPED | OUTPATIENT
Start: 2023-02-18

## 2023-02-18 RX ORDER — LABETALOL 200 MG/1
400 TABLET, FILM COATED ORAL EVERY 12 HOURS
Qty: 60 TABLET | Refills: 1 | Status: SHIPPED | OUTPATIENT
Start: 2023-02-18

## 2023-02-18 RX ORDER — IBUPROFEN 800 MG/1
800 TABLET ORAL
Qty: 60 TABLET | Refills: 1 | Status: SHIPPED | OUTPATIENT
Start: 2023-02-18

## 2023-02-18 RX ADMIN — LABETALOL HYDROCHLORIDE 200 MG: 200 TABLET, FILM COATED ORAL at 11:17

## 2023-02-18 RX ADMIN — FERROUS SULFATE TAB 325 MG (65 MG ELEMENTAL FE) 325 MG: 325 (65 FE) TAB at 09:18

## 2023-02-18 RX ADMIN — DOCUSATE SODIUM 100 MG: 100 CAPSULE, LIQUID FILLED ORAL at 09:18

## 2023-02-18 RX ADMIN — LABETALOL HYDROCHLORIDE 200 MG: 200 TABLET, FILM COATED ORAL at 09:18

## 2023-02-18 RX ADMIN — IBUPROFEN 800 MG: 400 TABLET, FILM COATED ORAL at 11:17

## 2023-02-18 RX ADMIN — IBUPROFEN 800 MG: 400 TABLET, FILM COATED ORAL at 01:27

## 2023-02-18 NOTE — DISCHARGE SUMMARY
Patient ID:  Timothy Magana  [id number]  55 y.o.  2000    Admit Date: 2023    Discharge Date: 2023     Admitting Physician: Lorna Prather MD  Attending Physician: Christi Izquierdo MD    Admission Diagnoses: Labor and delivery indication for care or intervention [O75.9]    Discharge Diagnoses: Same as above with PCS producing a viable infant. Information for the patient's :  Fatou Cheema [[id number]]           Additional Diagnoses:  late term . Maternal Labs:   Lab Results   Component Value Date/Time    HBsAg, External negative 2022 12:00 AM    HIV, External negative 2022 12:00 AM    Rubella, External immune 2022 12:00 AM    RPR, External non reactive 2022 12:00 AM    GrBStrep, External negative 2023 12:00 AM       Cord Blood Results:   Information for the patient's :  Fatou Cheema [[id number]]     Lab Results   Component Value Date/Time    ABO/Rh(D) O POSITIVE 02/15/2023 09:45 AM    DONA IgG NEG 02/15/2023 09:45 AM    Bilirubin if DONA pos: IF DIRECT MESFIN POSITIVE, BILIRUBIN TO FOLLOW 02/15/2023 09:45 AM            History of Present Illness:   OB History          1    Para   1    Term   1            AB        Living   1         SAB        IAB        Ectopic        Molar        Multiple   0    Live Births   1              Admitted for induction of labor. Hospital Course:   Patient was admitted as above and delivered via PCS . Please the chart for details. The postpartum course was complicated by events below. She was deemed stable for discharge home on day 3.    26 yo P1 POD 3 s/p PCS for active phase arrest after late term IOL. 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 PP course then complicated by mild to severe range BPs suspected due to the lipid treatment. Elevated Bps: gestational hypertension v. Reaction to lidocaine and/or lipid infusion.  Bps persistently mild range on Labetalol 200 mg po bid, started shortly after delivery. Neg preeclampsia labs. PCR was negative. Lab abnormalities initially in setting of recent lipid administration - repeat improved. - Increase Labetalol to 400 mg po bid. Postpartum anemia: due to acute blood loss. Pt asx with stable vitals. On po iron. Rh pos, Rub imm. Female infant. Routine care. DC home today. Instructions and precautions reviewed. Advised daily home BP monitoring and followup in office early next week for BP check. Parameters reviewed. Follow-up:  She was instructed to follow-up with her obstetrician in 3 days.     Results of Postpartum Depression Screening:  EPDS             Signed:  Delbert Ibanez MD  2/18/2023  10:17 AM

## 2023-02-18 NOTE — PROGRESS NOTES
Post-Operative  Day        Patient doing well without significant complaints. Tolerating diet, passing flatus, voiding and ambulating without difficulty. Denies ha/vision changes/ruq pain/cp/sob. Says she's ready to go home. Vitals:  Visit Vitals  BP (!) 145/93 (BP 1 Location: Left upper arm, BP Patient Position: At rest;Lying)   Pulse 86   Temp 97.9 °F (36.6 °C)   Resp 16   Ht 5' 2\" (1.575 m)   Wt 81.6 kg (180 lb)   SpO2 97%   Breastfeeding Unknown   BMI 32.92 kg/m²     Temp (24hrs), Av.9 °F (36.6 °C), Min:97.7 °F (36.5 °C), Max:98.1 °F (36.7 °C)        Exam:      Patient without distress. Abdomen: soft, expected tenderness, fundus firm                Wound incision clean, dry and intact               Lower extremities are nontender without edema. Labs:   Lab Results   Component Value Date/Time    WBC 26.1 (H) 2023 06:05 AM    WBC 32.4 (H) 02/15/2023 12:38 PM    WBC 13.6 (H) 2023 05:03 PM    HGB 8.1 (L) 2023 06:05 AM    HGB 10.7 (L) 02/15/2023 12:38 PM    HGB 10.6 (L) 2023 05:03 PM    HCT 24.8 (L) 2023 06:05 AM    HCT 33.6 (L) 02/15/2023 12:38 PM    HCT 34.1 (L) 2023 05:03 PM    PLATELET 273 445 06:05 AM    PLATELET 342  12:38 PM    PLATELET 576  05:03 PM       No results found for this or any previous visit (from the past 24 hour(s)). Assessment:   24 yo P1 POD 3 s/p PCS for active phase arrest after late term IOL. 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 PP course then complicated by mild to severe range BPs suspected due to the lipid treatment. Elevated Bps: gestational hypertension v. Reaction to lidocaine and/or lipid infusion. Bps persistently mild range on Labetalol 200 mg po bid, started shortly after delivery. Neg preeclampsia labs. PCR was negative. Lab abnormalities initially in setting of recent lipid administration - repeat improved. - Increase Labetalol to 400 mg po bid. Postpartum anemia: due to acute blood loss. Pt asx with stable vitals. On po iron. Rh pos, Rub imm. Female infant. Routine care. DC home today. Instructions and precautions reviewed. Advised daily home BP monitoring and followup in office early next week for BP check. Parameters reviewed.     Signed By: Santo Cockayne, MD     February 18, 2023

## 2023-02-18 NOTE — PROGRESS NOTES
Bedside and Verbal shift change report given to MIKEY Parker RN (oncoming nurse) by FINESSE Alba RN (offgoing nurse). Report included the following information SBAR, Intake/Output, and MAR.

## 2023-02-18 NOTE — ROUTINE PROCESS
Bedside and Verbal shift change report given to MIKEY Mora RN (oncoming nurse) by CHRIS Parker RN (offgoing nurse). Report included the following information SBAR.       1120:  Spoke with Dr. Heaven Mayo regarding patient's BP. Orders received to recheck in 1 hour following second dose of labetalol. 1220:  Spoke with Dr. Heaven Mayo regarding patient's BP. Okay to d/c and follow up on Monday. 1234: All discharge instructions reviewed and questions answered. Patient discharged home with father and .

## 2023-02-18 NOTE — DISCHARGE INSTRUCTIONS
Discharge Instructions for  Section    Patient ID:  Leonela Juarez  [id number]  25 y.o.  2000    Take Home Medications       Continue taking your prenatal vitamins if you are breastfeeding. Follow-up Appointment:  Follow-up with Dr. Jefry Good in 3 days. CHECK YOUR BLOOD PRESSURES AT HOME ONE TO TWO TIMES A DAY. CALL IF TOP NUMBER IS >150 OR BOTTOM NUMBER IS >100. Follow-up care is a key part of your treatment and safety. Be sure to make and go to all appointments, and call your doctor if you are having problems. Its also a good idea to know your test results and keep a list of the medicines you take. Activity  Avoid lifting more than 10 lbs for 6 weeks after your delivery. Don't put anything in your vagina for 6 weeks (no intercourse, tampons, or douching). Limit climbing stairs to twice a day, and please hold a railing. Have someone else carry your baby up stairs initially, as you may be a bit unsteady. No driving for about 2 weeks or until your are easily able to turn your body and move your foot from the gas to the brake pedal without pain- you need to be able to move quickly enough to respond to traffic. You cannot drive while you are taking narcotics (prescription pain medications). You may shower but do not take a bath for 2 weeks. Be sure to dry your incision well after your shower. You may gradually work up to light exercise such as brisk walking over the next few weeks, but take it easy- you'll be tired and sore! You need to wait 4-6 weeks before starting vigorous exercise such as aerobics, running, weight-lifting, sit-ups, etc.- clear this with your doctor at your postpartum check-up. Although it's important to limit certain activities and avoid \"over-doing it\", walking is good for you and will actually speed the healing process.   Walking increases the blood flow to your legs, decreasing the risk of blood clots, and also encourages the bowels to speed up, decreasing constipation, bloating, and gassiness. Don't stay in bed at home, get up and move around the house. You'll feel better more quickly. Diet  You may eat a regular diet but you may want to avoid heavy, greasy or spicy foods and other foods that could increase constipation and gas. Wound care  Your incision may have been closed with conventional (metal) staples, absorbable (dissolving) staples, or absorbable sutures. If you still have staples in your incision when you leave the hospital, call your doctor's office as instructed to schedule an appointment to have them removed (usually in about a week). Otherwise, there may be some small tapes over your incision called Steri-strips. Please remove these in about a week. There also may be Dermabond glue over your incision which looks like a clear, shiny coating (as if it were painted with clear fingernail polish). This will gradually peel off over the following weeks, do not remove it. It is normal to have a small amount of clear or reddish drainage from your incision. It's best to leave it open to the air, but if there is drainage, you may cover the incision lightly with gauze, preferably without tape. Keep the incision as dry as possible. You may even consider drying the incision with a cool hair dryer after you shower. Numbness of the skin at or around your incision is normal and the feeling usually returns gradually. Call your doctor if you have a lot of drainage from your incision, an unpleasant odor, red streaks, an increase in pain, or the incision appears to open up. Pain Management  You were probably given a prescription for pain medication, similar to the one you've been taking while in the hospital.  In addition to this, you can take over-the-counter pain medicines like Advil or Motrin (ibuprofen). You can take both medications together, alternating doses every few hours.   You will get the most relief if you take the maximum dose:  Motrin or Advil (generic ibuprofen) 800 mg every 8 hours (4 tablets or capsules every 8 hours). The prescription you were given probably contains a narcotic mixed with Tylenol, therefore, you should not take any extra Tylenol or acetominophen, or you could be getting more than the safe amount. If you feel you don't need the prescription pain medication, you may take Tylenol instead, along with ibuprofen. The maximum dose is Tylenol or acetominophen 1000 mg every 6 hours (equivalent to 2 Extra Strength Tylenols every 6 hours). After a few days, you should reduce the amount of prescription pain medication you are taking. At that point, try to use ibuprofen as the main medication, and take the prescription medication if needed for more severe pain not relieved by the ibuprofen. Your goal should be to take only the minimum necessary amounts of the prescription medication (narcotic), as these pain medicines can be addictive and will worsen or cause constipation. Most patients will find that within a few days, their pain is adequately controlled using only over-the-counter medications and minimal doses of prescription pain medicine. A heating pad can also be very helpful for cramping or back pain. Sitz baths are helpful for pain associated with hemorrhoids. You can use either a sitz bath basin or a bathtub filled with 2-3\" inches of plain warm water. Soak for 10 minutes 3 times a day. Over-the-counter hemorrhoid wipes and ointments are fine to use as well. Constipation  You may find that bowel movements are irregular after delivery and that you have a tendency to be constipated, especially after surgery. A stool softener such as Colace (docusate) can safely be taken daily if needed. If you become constipated you can use a laxative such as Dulcolax, Miralax or Milk of Magnesia. Don't wait until constipation is severe- take something sooner rather than later and you will feel much better!    Suppositories such as Dulcolax or Fleet's Enemas are options too. Your Recovery: What to Expect at Home  Delivering a baby is hard work and you probably aren't getting much sleep, so you will certainly be tired, especially after having a  section. Try to rest when you can and don't worry about doing housework or other tasks which can wait. The soreness in your incision will improve significantly over the first 2 weeks or so, but it may take 6-8 weeks before you are completely recovered. You are likely to have some back pain or general body aches or muscle soreness. This should improve with acetominophen or ibuprofen. If your legs were swollen during your pregnancy or as a result of IV fluids given during your hospital stay, this should go away in a few days to a week. Most women experience some form of the \"Baby Blues\" after having a baby. This means that you may feel emotional, tearful, frustrated, anxious, sad, and irritable some of the time. This is normal if it's not severe and should go away after about 2 weeks. Getting as much rest as you can will help. Accept assistance from friends and family members so that you can take breaks from caring for the baby. Call your doctor if your symptoms seem severe, last more than 2 weeks, or seem to be getting worse instead of better. Get help immediately if you have thoughts of wanting to hurt yourself or others! When should you call for help? Call 911 anytime you think you may need emergency care. For example, call if:  You pass out (lose consciousness). You have sudden chest pain and shortness of breath, or you cough up blood. You have severe pain in your belly. Call your doctor now or seek immediate medical care if:  You have heavy vaginal bleeding that soaks one or more pads in an hour, or you have large clots (golf ball size or larger). Your have foul-smelling discharge from your vagina or incision.   You are sick to your stomach or cannot keep fluids down.  You have pain that does not get better after you take pain medicine. You have signs of infection, such as: Increased pain in your abdomen or vaginal area. Red streaks, warmth, or tenderness of your breasts or the area around your incision. A fever of 101 or greater (taken by mouth). You have signs of a blood clot, such as:  Pain in your calf, back of knee, thigh, or groin. Redness and swelling in your leg or groin. You have trouble passing urine or stool, especially if you have pain or swelling in your lower belly; or if you are unable to have a bowel movement after taking a laxative. You have a fast or pounding heartbeat.

## (undated) DEVICE — REM POLYHESIVE ADULT PATIENT RETURN ELECTRODE: Brand: VALLEYLAB

## (undated) DEVICE — PACK PROCEDURE SURG C SECT KT SMH

## (undated) DEVICE — SUTURE MCRYL SZ 1 L36IN ABSRB VLT CT-1 L36MM 1/2 CIR TAPR Y347H

## (undated) DEVICE — DRAPE FLD WRM W44XL66IN C6L FOR INTRATEMP SYS THERMABASIN

## (undated) DEVICE — SUTURE MCRYL SZ 4-0 L27IN ABSRB UD L24MM PS-1 3/8 CIR PRIM Y935H

## (undated) DEVICE — SOLUTION IRRIG 1000ML STRL H2O USP PLAS POUR BTL

## (undated) DEVICE — (D)PREP SKN CHLRAPRP APPL 26ML -- CONVERT TO ITEM 371833

## (undated) DEVICE — KENDALL SCD EXPRESS SLEEVES, KNEE LENGTH, MEDIUM: Brand: KENDALL SCD

## (undated) DEVICE — SUTURE VCRL SZ 3-0 L36IN ABSRB VLT CT L40MM 1/2 CIR J356H

## (undated) DEVICE — STERILE POLYISOPRENE POWDER-FREE SURGICAL GLOVES: Brand: PROTEXIS

## (undated) DEVICE — ATTACHMENT SMK 3/8INX10FT VALLEYLAB

## (undated) DEVICE — 3000CC GUARDIAN II: Brand: GUARDIAN

## (undated) DEVICE — LIGHT HANDLE: Brand: DEVON

## (undated) DEVICE — DEVICE ES L3M EDGE COAT HEX LOK BLDE ELECTRD HOLSTER FORC

## (undated) DEVICE — APPLICATOR MEDICATED 26 CC SOLUTION HI LT ORNG CHLORAPREP

## (undated) DEVICE — ROYALSILK SURGICAL GOWN, L: Brand: CONVERTORS

## (undated) DEVICE — STERILE POLYISOPRENE POWDER-FREE SURGICAL GLOVES WITH EMOLLIENT COATING: Brand: PROTEXIS

## (undated) DEVICE — COVERALL PREM SMS 2XL KNIT --

## (undated) DEVICE — CATH FOLEY 16F LUBRI-SIL IC --

## (undated) DEVICE — DEVON™ KNEE AND BODY STRAP 60" X 3" (1.5 M X 7.6 CM): Brand: DEVON

## (undated) DEVICE — SUTURE VCRL SZ 0 L36IN ABSRB VLT L40MM CT 1/2 CIR J358H

## (undated) DEVICE — SPONGE: LAP 18X18 W  200/CS: Brand: MEDICAL ACTION INDUSTRIES

## (undated) DEVICE — SOLUTION IRRIG 1000ML 0.9% SOD CHL USP POUR PLAS BTL

## (undated) DEVICE — POOLE SUCTION INSTRUMENT WITH REMOVABLE SHEATH: Brand: POOLE

## (undated) DEVICE — DRESSING HYDROCOLLOID BORDER 35X10 IN ALUM PRIMASEAL